# Patient Record
Sex: MALE | Race: WHITE | Employment: OTHER | URBAN - METROPOLITAN AREA
[De-identification: names, ages, dates, MRNs, and addresses within clinical notes are randomized per-mention and may not be internally consistent; named-entity substitution may affect disease eponyms.]

---

## 2017-08-21 PROBLEM — C85.80 MARGINAL ZONE LYMPHOMA (HCC): Status: ACTIVE | Noted: 2017-08-21

## 2018-05-21 ENCOUNTER — OFFICE VISIT (OUTPATIENT)
Dept: FAMILY MEDICINE CLINIC | Age: 78
End: 2018-05-21
Payer: MEDICARE

## 2018-05-21 VITALS
TEMPERATURE: 97.8 F | HEART RATE: 78 BPM | WEIGHT: 178 LBS | HEIGHT: 67 IN | RESPIRATION RATE: 14 BRPM | SYSTOLIC BLOOD PRESSURE: 138 MMHG | OXYGEN SATURATION: 97 % | BODY MASS INDEX: 27.94 KG/M2 | DIASTOLIC BLOOD PRESSURE: 87 MMHG

## 2018-05-21 DIAGNOSIS — S39.012A STRAIN OF LUMBAR REGION, INITIAL ENCOUNTER: Primary | ICD-10-CM

## 2018-05-21 PROCEDURE — 99213 OFFICE O/P EST LOW 20 MIN: CPT | Performed by: FAMILY MEDICINE

## 2018-06-10 ASSESSMENT — ENCOUNTER SYMPTOMS
DIARRHEA: 0
SHORTNESS OF BREATH: 0
BLOOD IN STOOL: 0
ABDOMINAL PAIN: 0
VOMITING: 0
CHEST TIGHTNESS: 0
CONSTIPATION: 0
COUGH: 0
BACK PAIN: 1
WHEEZING: 0

## 2018-07-31 ENCOUNTER — TELEPHONE (OUTPATIENT)
Dept: FAMILY MEDICINE CLINIC | Age: 78
End: 2018-07-31

## 2018-07-31 DIAGNOSIS — I10 ESSENTIAL HYPERTENSION: ICD-10-CM

## 2018-07-31 DIAGNOSIS — C85.90 STAGE 4 LOW GRADE LYMPHOMA (HCC): ICD-10-CM

## 2018-07-31 DIAGNOSIS — D69.6 THROMBOCYTOPENIA (HCC): ICD-10-CM

## 2018-07-31 DIAGNOSIS — C61 PROSTATE CANCER (HCC): Primary | ICD-10-CM

## 2018-08-03 ENCOUNTER — HOSPITAL ENCOUNTER (OUTPATIENT)
Age: 78
Discharge: HOME | End: 2018-08-03
Payer: MEDICARE

## 2018-08-03 DIAGNOSIS — I10 ESSENTIAL HYPERTENSION: ICD-10-CM

## 2018-08-03 DIAGNOSIS — C61 PROSTATE CANCER (HCC): ICD-10-CM

## 2018-08-03 DIAGNOSIS — D69.6 THROMBOCYTOPENIA (HCC): ICD-10-CM

## 2018-08-03 LAB
ANION GAP SERPL CALCULATED.3IONS-SCNC: 13 MMOL/L (ref 7–16)
BASOPHILS ABSOLUTE: 0.06 E9/L (ref 0–0.2)
BASOPHILS RELATIVE PERCENT: 0.8 % (ref 0–2)
BUN BLDV-MCNC: 20 MG/DL (ref 8–23)
CALCIUM SERPL-MCNC: 9.1 MG/DL (ref 8.6–10.2)
CHLORIDE BLD-SCNC: 103 MMOL/L (ref 98–107)
CO2: 22 MMOL/L (ref 22–29)
CREAT SERPL-MCNC: 1 MG/DL (ref 0.7–1.2)
EOSINOPHILS ABSOLUTE: 0.3 E9/L (ref 0.05–0.5)
EOSINOPHILS RELATIVE PERCENT: 3.9 % (ref 0–6)
GFR AFRICAN AMERICAN: >60
GFR NON-AFRICAN AMERICAN: >60 ML/MIN/1.73
GLUCOSE BLD-MCNC: 95 MG/DL (ref 74–109)
HCT VFR BLD CALC: 39.5 % (ref 37–54)
HEMOGLOBIN: 13.8 G/DL (ref 12.5–16.5)
IMMATURE GRANULOCYTES #: 0.04 E9/L
IMMATURE GRANULOCYTES %: 0.5 % (ref 0–5)
LYMPHOCYTES ABSOLUTE: 1.36 E9/L (ref 1.5–4)
LYMPHOCYTES RELATIVE PERCENT: 17.9 % (ref 20–42)
MCH RBC QN AUTO: 29.1 PG (ref 26–35)
MCHC RBC AUTO-ENTMCNC: 34.9 % (ref 32–34.5)
MCV RBC AUTO: 83.3 FL (ref 80–99.9)
MONOCYTES ABSOLUTE: 0.84 E9/L (ref 0.1–0.95)
MONOCYTES RELATIVE PERCENT: 11.1 % (ref 2–12)
NEUTROPHILS ABSOLUTE: 5 E9/L (ref 1.8–7.3)
NEUTROPHILS RELATIVE PERCENT: 65.8 % (ref 43–80)
PDW BLD-RTO: 14.4 FL (ref 11.5–15)
PLATELET # BLD: 213 E9/L (ref 130–450)
PMV BLD AUTO: 10.2 FL (ref 7–12)
POTASSIUM SERPL-SCNC: 4 MMOL/L (ref 3.5–5)
PROSTATE SPECIFIC ANTIGEN: 6.44 NG/ML (ref 0–4)
RBC # BLD: 4.74 E12/L (ref 3.8–5.8)
SODIUM BLD-SCNC: 138 MMOL/L (ref 132–146)
WBC # BLD: 7.6 E9/L (ref 4.5–11.5)

## 2018-08-03 PROCEDURE — 36415 COLL VENOUS BLD VENIPUNCTURE: CPT

## 2018-08-03 PROCEDURE — 85025 COMPLETE CBC W/AUTO DIFF WBC: CPT

## 2018-08-03 PROCEDURE — 80048 BASIC METABOLIC PNL TOTAL CA: CPT

## 2018-08-03 PROCEDURE — 84153 ASSAY OF PSA TOTAL: CPT

## 2018-08-04 ENCOUNTER — HOSPITAL ENCOUNTER (EMERGENCY)
Age: 78
Discharge: HOME | End: 2018-08-04
Attending: EMERGENCY MEDICINE
Payer: MEDICARE

## 2018-08-04 VITALS
WEIGHT: 170 LBS | OXYGEN SATURATION: 95 % | SYSTOLIC BLOOD PRESSURE: 122 MMHG | DIASTOLIC BLOOD PRESSURE: 70 MMHG | BODY MASS INDEX: 26.68 KG/M2 | HEIGHT: 67 IN | RESPIRATION RATE: 14 BRPM | HEART RATE: 72 BPM | TEMPERATURE: 98.4 F

## 2018-08-04 DIAGNOSIS — R33.9 URINARY RETENTION: Primary | ICD-10-CM

## 2018-08-04 LAB
BACTERIA: NORMAL /HPF
BILIRUBIN URINE: NEGATIVE
BLOOD, URINE: ABNORMAL
CLARITY: CLEAR
COLOR: YELLOW
GLUCOSE URINE: NEGATIVE MG/DL
KETONES, URINE: NEGATIVE MG/DL
LEUKOCYTE ESTERASE, URINE: NEGATIVE
NITRITE, URINE: NEGATIVE
PH UA: 5.5 (ref 5–9)
PROTEIN UA: NEGATIVE MG/DL
RBC UA: NORMAL /HPF (ref 0–2)
SPECIFIC GRAVITY UA: 1.02 (ref 1–1.03)
UROBILINOGEN, URINE: 0.2 E.U./DL
WBC UA: NORMAL /HPF (ref 0–5)

## 2018-08-04 PROCEDURE — 99283 EMERGENCY DEPT VISIT LOW MDM: CPT

## 2018-08-04 PROCEDURE — 81001 URINALYSIS AUTO W/SCOPE: CPT

## 2018-08-04 PROCEDURE — 51702 INSERT TEMP BLADDER CATH: CPT

## 2018-08-04 RX ORDER — TAMSULOSIN HYDROCHLORIDE 0.4 MG/1
0.4 CAPSULE ORAL DAILY
Qty: 12 CAPSULE | Refills: 0 | Status: SHIPPED | OUTPATIENT
Start: 2018-08-04 | End: 2018-12-20

## 2018-08-04 ASSESSMENT — PAIN DESCRIPTION - ORIENTATION: ORIENTATION: LOWER

## 2018-08-04 ASSESSMENT — PAIN DESCRIPTION - FREQUENCY: FREQUENCY: CONTINUOUS

## 2018-08-04 ASSESSMENT — PAIN DESCRIPTION - DESCRIPTORS: DESCRIPTORS: PRESSURE

## 2018-08-04 ASSESSMENT — PAIN SCALES - GENERAL: PAINLEVEL_OUTOF10: 5

## 2018-08-04 ASSESSMENT — PAIN DESCRIPTION - LOCATION: LOCATION: ABDOMEN

## 2018-08-04 ASSESSMENT — PAIN DESCRIPTION - PAIN TYPE: TYPE: ACUTE PAIN

## 2018-08-04 NOTE — ED NOTES
Leg bag emptied of blood tinged urine. Catheter care and leg bag/bed bag instructions given. Verbalized understanding by patient and daughter.      Valentin Homans, RN  08/04/18 1024

## 2018-08-22 ENCOUNTER — OFFICE VISIT (OUTPATIENT)
Dept: FAMILY MEDICINE CLINIC | Age: 78
End: 2018-08-22
Payer: MEDICARE

## 2018-08-22 VITALS
BODY MASS INDEX: 26.37 KG/M2 | HEART RATE: 78 BPM | DIASTOLIC BLOOD PRESSURE: 70 MMHG | SYSTOLIC BLOOD PRESSURE: 106 MMHG | TEMPERATURE: 96.7 F | WEIGHT: 168 LBS | HEIGHT: 67 IN | RESPIRATION RATE: 16 BRPM

## 2018-08-22 DIAGNOSIS — C61 PROSTATE CANCER (HCC): Primary | ICD-10-CM

## 2018-08-22 DIAGNOSIS — E78.5 HYPERLIPIDEMIA, UNSPECIFIED HYPERLIPIDEMIA TYPE: ICD-10-CM

## 2018-08-22 DIAGNOSIS — I10 ESSENTIAL HYPERTENSION: ICD-10-CM

## 2018-08-22 DIAGNOSIS — Z23 NEED FOR SHINGLES VACCINE: ICD-10-CM

## 2018-08-22 DIAGNOSIS — R73.9 HYPERGLYCEMIA: ICD-10-CM

## 2018-08-22 PROCEDURE — 90750 HZV VACC RECOMBINANT IM: CPT | Performed by: FAMILY MEDICINE

## 2018-08-22 PROCEDURE — 90471 IMMUNIZATION ADMIN: CPT | Performed by: FAMILY MEDICINE

## 2018-08-22 PROCEDURE — 99214 OFFICE O/P EST MOD 30 MIN: CPT | Performed by: FAMILY MEDICINE

## 2018-08-22 ASSESSMENT — PATIENT HEALTH QUESTIONNAIRE - PHQ9
2. FEELING DOWN, DEPRESSED OR HOPELESS: 0
SUM OF ALL RESPONSES TO PHQ QUESTIONS 1-9: 0
SUM OF ALL RESPONSES TO PHQ QUESTIONS 1-9: 0
SUM OF ALL RESPONSES TO PHQ9 QUESTIONS 1 & 2: 0
1. LITTLE INTEREST OR PLEASURE IN DOING THINGS: 0

## 2018-08-22 NOTE — PROGRESS NOTES
Kade Bartlett  : 1940    Chief Complaint:     Chief Complaint   Patient presents with    Hypertension       HPI  Will see Dr Marc Braswell today for jalloh catheter removal since  with urinary retention. PSAs have been stable. HTN controlled, taking meds as rx. Denies symptoms high bp including HA, vision changes, CP, SOB, edema, focal neuro deficits. No symptoms low bp. Has HLD. Taking statin. No s/e. Attempting to watch diet including fatty foods. No CP, dyspnea, chest tightness, exertional symptoms, neuro deficits. Lab Results   Component Value Date    CHOL 177 2018    TRIG 181 2018    HDL 36 2018    LDLCALC 105 2018    LABVLDL 36 2018         Past medical, surgical, family and social histories reviewed and updated today as appropriate    Health Maintenance Due   Topic Date Due    DTaP/Tdap/Td vaccine (1 - Tdap) 1959       Current Outpatient Prescriptions   Medication Sig Dispense Refill    amLODIPine (NORVASC) 5 MG tablet TAKE 1 TABLET DAILY 90 tablet 3    lisinopril (PRINIVIL;ZESTRIL) 20 MG tablet TAKE ONE-HALF (1/2) TABLET TWICE A DAY 90 tablet 3    metoprolol tartrate (LOPRESSOR) 25 MG tablet TAKE ONE-HALF (1/2) TABLET TWICE A DAY 90 tablet 3    vitamin E 400 UNIT capsule Take 400 Units by mouth daily.  Vitamin D (CHOLECALCIFEROL) 1000 UNITS CAPS capsule Take 1,000 Units by mouth daily.  tamsulosin (FLOMAX) 0.4 MG capsule Take 1 capsule by mouth daily for 12 days 12 capsule 0     No current facility-administered medications for this visit. No Known Allergies      REVIEW OF SYSTEMS  Review of Systems   Constitutional: Negative for chills, diaphoresis and fever. Eyes: Negative for redness and visual disturbance. Respiratory: Negative for cough, chest tightness, shortness of breath and wheezing. Cardiovascular: Negative for chest pain, palpitations and leg swelling.    Gastrointestinal: Negative for abdominal pain, blood in stool, constipation, diarrhea and vomiting. Endocrine: Negative for polydipsia and polyuria. Skin: Negative for color change, pallor and rash. Neurological: Negative for dizziness, syncope, weakness, numbness and headaches. Psychiatric/Behavioral: Negative for confusion and dysphoric mood. The patient is not nervous/anxious. All other systems reviewed and are negative. PHYSICAL EXAM  /70   Pulse 78   Temp 96.7 °F (35.9 °C)   Resp 16   Ht 5' 7\" (1.702 m)   Wt 168 lb (76.2 kg)   BMI 26.31 kg/m²   Physical Exam   Constitutional: He is oriented to person, place, and time. He appears well-developed and well-nourished. No distress. Neck: Neck supple. No JVD present. No thyromegaly present. Cardiovascular: Normal rate and regular rhythm. Exam reveals no gallop and no friction rub. No murmur heard. Pulmonary/Chest: Effort normal and breath sounds normal. No respiratory distress. He has no wheezes. He has no rales. Abdominal: Soft. Bowel sounds are normal. He exhibits no distension. There is no tenderness. Musculoskeletal: He exhibits no edema. Lymphadenopathy:     He has no cervical adenopathy. Neurological: He is alert and oriented to person, place, and time. Skin: Skin is warm and dry. No rash noted. No erythema. No pallor. Vitals reviewed. ASSESSMENT/PLAN:     Diagnosis Orders   1. Prostate cancer ([address]  PSA, Diagnostic   2. Need for shingles vaccine  Zoster recombinant [facility])   3. Essential hypertension  CBC Auto Differential    Comprehensive Metabolic Panel    Hemoglobin A1C   4. Hyperlipidemia, unspecified hyperlipidemia type  Lipid Panel    Hemoglobin A1C   5. Hyperglycemia  Hemoglobin A1C     Urology  BP controlled  Lipids controlled  Check A1c  The patient is asked to make an attempt to improve diet and exercise patterns to aid in medical management of this problem. Reviewed age and gender appropriate health screening exams and vaccinations. Advised patient regarding importance of keeping up with recommended health maintenance and to schedule as soon as possible if overdue, as this is important in assessing for undiagnosed pathology, especially cancer. Patient verbalizes understanding and agrees. Call or go to ED immediately if symptoms worsen or persist.  No Follow-up on file. Sooner if necessary. Counseled regarding above diagnosis, including possible risks and complications,  especially if left uncontrolled. Counseled regarding the possible side effects, risks, benefits and alternatives to treatment; patient and/or guardian verbalizes understanding. Advised patient to call with any new medication issues. All questions answered.     Xavier Kovacs MD  8/22/18

## 2018-08-24 ENCOUNTER — HOSPITAL ENCOUNTER (EMERGENCY)
Age: 78
Discharge: HOME | End: 2018-08-24
Attending: EMERGENCY MEDICINE
Payer: MEDICARE

## 2018-08-24 VITALS
RESPIRATION RATE: 16 BRPM | OXYGEN SATURATION: 95 % | WEIGHT: 146 LBS | SYSTOLIC BLOOD PRESSURE: 146 MMHG | BODY MASS INDEX: 22.91 KG/M2 | TEMPERATURE: 98.3 F | HEART RATE: 94 BPM | HEIGHT: 67 IN | DIASTOLIC BLOOD PRESSURE: 76 MMHG

## 2018-08-24 DIAGNOSIS — R33.9 URINARY RETENTION: Primary | ICD-10-CM

## 2018-08-24 PROCEDURE — 51702 INSERT TEMP BLADDER CATH: CPT

## 2018-08-24 PROCEDURE — 99283 EMERGENCY DEPT VISIT LOW MDM: CPT

## 2018-08-25 NOTE — ED PROVIDER NOTES
personally reviewed by me in its entirety. I confirm that the note above accurately reflects all work, treatment, procedures, and medical decision making performed by me.          Loreta Snyder DO  08/24/18 [id number]

## 2018-08-25 NOTE — ED NOTES
Discharge instructions reviewed with pt including diagnosis, follow up appointments, and S/S of when to return.   Pt verbalized understanding      Brenda Jonas RN  08/24/18 7004

## 2018-08-25 NOTE — ED NOTES
Total output of jalloh catheter about [address] [facility], [address] [place] [address]  08/24/18 2230

## 2018-08-27 ENCOUNTER — HOSPITAL ENCOUNTER (EMERGENCY)
Age: 78
Discharge: HOME | End: 2018-08-27
Attending: EMERGENCY MEDICINE
Payer: MEDICARE

## 2018-08-27 VITALS
OXYGEN SATURATION: 99 % | TEMPERATURE: 97.8 F | WEIGHT: 146 LBS | HEART RATE: 80 BPM | DIASTOLIC BLOOD PRESSURE: 70 MMHG | HEIGHT: 67 IN | RESPIRATION RATE: 14 BRPM | SYSTOLIC BLOOD PRESSURE: 116 MMHG | BODY MASS INDEX: 22.91 KG/M2

## 2018-08-27 DIAGNOSIS — R33.9 URINARY RETENTION: Primary | ICD-10-CM

## 2018-08-27 LAB
BACTERIA: ABNORMAL /HPF
BILIRUBIN URINE: NEGATIVE
BLOOD, URINE: ABNORMAL
CLARITY: CLEAR
COLOR: YELLOW
GLUCOSE URINE: NEGATIVE MG/DL
KETONES, URINE: NEGATIVE MG/DL
LEUKOCYTE ESTERASE, URINE: NEGATIVE
NITRITE, URINE: NEGATIVE
PH UA: 6 (ref 5–9)
PROTEIN UA: 100 MG/DL
RBC UA: ABNORMAL /HPF (ref 0–2)
SPECIFIC GRAVITY UA: 1.01 (ref 1–1.03)
UROBILINOGEN, URINE: 0.2 E.U./DL
WBC UA: ABNORMAL /HPF (ref 0–5)

## 2018-08-27 PROCEDURE — 87088 URINE BACTERIA CULTURE: CPT

## 2018-08-27 PROCEDURE — 81001 URINALYSIS AUTO W/SCOPE: CPT

## 2018-08-27 PROCEDURE — 99283 EMERGENCY DEPT VISIT LOW MDM: CPT

## 2018-08-27 NOTE — ED PROVIDER NOTES
ED Attending  CC: No     Department of Emergency Medicine   ED  Provider Note  Admit Date/RoomTime: 8/27/2018 11:32 AM  ED Room: 07/07  Chief Complaint   Other (catheter check)    History of Present Illness   Source of history provided by:  patient. History/Exam Limitations: none. Chuy Presley is a 69 y.o. old male who has a past medical history of:   Past Medical History:   Diagnosis Date    Hypertension     Urinary retention     presents to the emergency department by private vehicle and accompanied by family member, for desire to find out why he has needed to have a catheter intermittently for the last month. Since onset the symptoms have been intermittent and moderate in severity. Symptoms are associated with nothing pertinent. He has a history of no complicating symptoms. He states that he is told his prostate is \"fairly normal\". He states he has been taking the flomax as prescribed earlier in August. He states he was seen in the ED on 8/4 and had a catheter placed and it was removed on 8/10 at the office of Dr. Nabil Mcgovern his urologist. He states he had to return later that day for replacement of catheter due to inability to void. He states he was then seen in Dr. Jesse Esparza office on 8/24 and had the catheter removed and required the catheter to be placed again in the ED later that same night. He denies fevers, chills, flank pain, nausea, vomiting, or abdominal pain. Urine is without gross hematuria and is not cloudy in appearance. He states he attempted to call Dr. Nabil Mcgovern today but he was in the OR and was told to \"go to the emergency department\". He states that he plans on calling Dr. Nabil Mcgovern tomorrow. He states that he just wants to know what is going on. ROS    Pertinent positives and negatives are stated within HPI, all other systems reviewed and are negative.     Past Surgical History:   Procedure Laterality Date    APPENDECTOMY      COLONOSCOPY      TONSILLECTOMY     Social History: reports that he has never smoked. He has never used smokeless tobacco. He reports that he does not drink alcohol or use drugs. Family History: family history is not on file. Allergies: Patient has no known allergies. Physical Exam            ED Triage Vitals   BP Temp Temp Source Pulse Resp SpO2 Height Weight   08/27/18 1140 08/27/18 1140 08/27/18 1140 08/27/18 1140 08/27/18 1140 08/27/18 1140 08/27/18 1142 08/27/18 1142   116/70 97.8 °F (36.6 °C) Oral 80 14 99 % 5' 7\" (1.702 m) 146 lb (66.2 kg)      Oxygen Saturation Interpretation: Normal.    · Constitutional:  Alert, development consistent with age. · HEENT:  NC/NT. Airway patent. · Neck:  Normal ROM. Supple. · Respiratory:  Lungs Clear to auscultation and breath sounds equal.  · CV:  Regular rate and rhythm, normal heart sounds, without pathological murmurs, ectopy, gallops, or rubs. · GI:  General Appearance: normal.         Bowel sounds: normal bowel sounds. Distension:  None. Tenderness: No abdominal tenderness, no rebound tenderness, no guarding, abdominal rigidity is absent. Liver: non-tender. Spleen:  non-tender. Pulsatile Mass: absent. Hernia:  no inguinal or femoral hernias noted. · : (chaperone present during examination). Rectal exam not completed. Male genitalia without gross abnormalities   · Back: CVA Tenderness: No.  · Integument:  Normal turgor. Warm, dry, without visible rash, unless noted elsewhere. Lymphatics: No lymphangitis or adenopathy noted. · Neurological:  Oriented. Motor functions intact.     Lab / Imaging Results   (All laboratory and radiology results have been personally reviewed by myself)  Labs:  Results for orders placed or performed during the hospital encounter of 08/27/18   Urinalysis   Result Value Ref Range    Color, UA Yellow Straw/Yellow    Clarity, UA Clear Clear    Glucose, Ur Negative Negative mg/dL    Bilirubin Urine

## 2018-08-27 NOTE — ED NOTES
Patient presents to er with catheter in , patient is wanting to know what is going on with his jalloh cath, patient has been seen in er and dr Georgie Finney office with jalloh insertion for retention then removal , and retention returning and cath reinserted      Debbie Gaines RN  08/27/18 1520

## 2018-08-27 NOTE — ED NOTES
sitting in bed patient up to restroom,and emptied catheter in restroom      Sarita Hatch RN  08/27/18 7398

## 2018-08-29 LAB — URINE CULTURE, ROUTINE: NORMAL

## 2018-09-03 ASSESSMENT — ENCOUNTER SYMPTOMS
BLOOD IN STOOL: 0
EYE REDNESS: 0
SHORTNESS OF BREATH: 0
WHEEZING: 0
CHEST TIGHTNESS: 0
COLOR CHANGE: 0
DIARRHEA: 0
ABDOMINAL PAIN: 0
COUGH: 0
CONSTIPATION: 0
VOMITING: 0

## 2018-09-05 ENCOUNTER — TELEPHONE (OUTPATIENT)
Dept: FAMILY MEDICINE CLINIC | Age: 78
End: 2018-09-05

## 2018-09-05 NOTE — TELEPHONE ENCOUNTER
Spoke with Edinson Renner. Pt had a cath over the last 5wks that was removed this morning. They added fluid to his bladder and scoped his bladder. Daughter was concerned that he had not released the fluid by 1pm. Dr Hitesh Pop, urology office said to call them if he had not released the fluid by 2pm. She wanted to know if here was something else that could have been ordered from this office to help  diagnose the fluid retention issue. Daughter said that she will call if there are any other problems but she wanted to know if there may be something else that is being missed since he is getting so weak and still having the same issue.   #[phone]

## 2018-09-07 ENCOUNTER — TELEPHONE (OUTPATIENT)
Dept: FAMILY MEDICINE CLINIC | Age: 78
End: 2018-09-07

## 2018-09-07 ENCOUNTER — HOSPITAL ENCOUNTER (OUTPATIENT)
Age: 78
Discharge: HOME | End: 2018-09-07
Payer: MEDICARE

## 2018-09-07 LAB
ALBUMIN SERPL-MCNC: 3.1 G/DL (ref 3.5–5.2)
ALP BLD-CCNC: 66 U/L (ref 40–129)
ALT SERPL-CCNC: 18 U/L (ref 0–40)
ANION GAP SERPL CALCULATED.3IONS-SCNC: 14 MMOL/L (ref 7–16)
AST SERPL-CCNC: 13 U/L (ref 0–39)
BILIRUB SERPL-MCNC: 0.4 MG/DL (ref 0–1.2)
BUN BLDV-MCNC: 20 MG/DL (ref 8–23)
CALCIUM SERPL-MCNC: 9.3 MG/DL (ref 8.6–10.2)
CHLORIDE BLD-SCNC: 100 MMOL/L (ref 98–107)
CO2: 21 MMOL/L (ref 22–29)
CREAT SERPL-MCNC: 1 MG/DL (ref 0.7–1.2)
GFR AFRICAN AMERICAN: >60
GFR NON-AFRICAN AMERICAN: >60 ML/MIN/1.73
GLUCOSE BLD-MCNC: 94 MG/DL (ref 74–109)
HCT VFR BLD CALC: 36.4 % (ref 37–54)
HEMOGLOBIN: 12 G/DL (ref 12.5–16.5)
MCH RBC QN AUTO: 28 PG (ref 26–35)
MCHC RBC AUTO-ENTMCNC: 33 % (ref 32–34.5)
MCV RBC AUTO: 84.8 FL (ref 80–99.9)
PDW BLD-RTO: 14.3 FL (ref 11.5–15)
PLATELET # BLD: 263 E9/L (ref 130–450)
PMV BLD AUTO: 10 FL (ref 7–12)
POTASSIUM SERPL-SCNC: 4.3 MMOL/L (ref 3.5–5)
RBC # BLD: 4.29 E12/L (ref 3.8–5.8)
SODIUM BLD-SCNC: 135 MMOL/L (ref 132–146)
TOTAL PROTEIN: 5.4 G/DL (ref 6.4–8.3)
WBC # BLD: 6.4 E9/L (ref 4.5–11.5)

## 2018-09-07 PROCEDURE — 80053 COMPREHEN METABOLIC PANEL: CPT

## 2018-09-07 PROCEDURE — 36415 COLL VENOUS BLD VENIPUNCTURE: CPT

## 2018-09-07 PROCEDURE — 85027 COMPLETE CBC AUTOMATED: CPT

## 2018-09-07 NOTE — TELEPHONE ENCOUNTER
Daughter called. He was seen by urology again, they replaced the cath. He was seen by Hortencia Daly today. They think his urine difficulty may be from constipation. They are giving him some meds to try to help with his bowels and then reevaluate. His BP has been around 100-102/62-68. He is somewhat confused and they think it might be from his bp meds and the Flomax. His daughter wanted to know what you think about his BP numbers and if you wanted to see him.

## 2018-09-13 NOTE — TELEPHONE ENCOUNTER
Lactulose, citracil, mirilax , was given by GI. Patient daughter stated that mag citrate given Monday night cleared pt mon, Tuesday, but pt has not moved bowels since.   Dr Anjel Weldon was seen this am and he believes that patient may have a bowel obstruction

## 2018-09-14 ENCOUNTER — HOSPITAL ENCOUNTER (EMERGENCY)
Age: 78
Discharge: HOME | End: 2018-09-14
Attending: EMERGENCY MEDICINE
Payer: MEDICARE

## 2018-09-14 ENCOUNTER — APPOINTMENT (OUTPATIENT)
Dept: CT IMAGING | Age: 78
End: 2018-09-14
Payer: MEDICARE

## 2018-09-14 VITALS
HEART RATE: 77 BPM | DIASTOLIC BLOOD PRESSURE: 77 MMHG | WEIGHT: 164 LBS | OXYGEN SATURATION: 97 % | TEMPERATURE: 97.6 F | SYSTOLIC BLOOD PRESSURE: 138 MMHG | RESPIRATION RATE: 16 BRPM | HEIGHT: 67 IN | BODY MASS INDEX: 25.74 KG/M2

## 2018-09-14 DIAGNOSIS — K59.00 CONSTIPATION, UNSPECIFIED CONSTIPATION TYPE: ICD-10-CM

## 2018-09-14 DIAGNOSIS — C79.9 METASTATIC CANCER (HCC): Primary | ICD-10-CM

## 2018-09-14 LAB
ANION GAP SERPL CALCULATED.3IONS-SCNC: 12 MMOL/L (ref 7–16)
BASOPHILS ABSOLUTE: 0.07 E9/L (ref 0–0.2)
BASOPHILS RELATIVE PERCENT: 0.7 % (ref 0–2)
BUN BLDV-MCNC: 16 MG/DL (ref 8–23)
CALCIUM SERPL-MCNC: 8.9 MG/DL (ref 8.6–10.2)
CHLORIDE BLD-SCNC: 98 MMOL/L (ref 98–107)
CO2: 23 MMOL/L (ref 22–29)
CREAT SERPL-MCNC: 1 MG/DL (ref 0.7–1.2)
EOSINOPHILS ABSOLUTE: 0.27 E9/L (ref 0.05–0.5)
EOSINOPHILS RELATIVE PERCENT: 2.6 % (ref 0–6)
GFR AFRICAN AMERICAN: >60
GFR NON-AFRICAN AMERICAN: >60 ML/MIN/1.73
GLUCOSE BLD-MCNC: 101 MG/DL (ref 74–109)
HCT VFR BLD CALC: 39.2 % (ref 37–54)
HEMOGLOBIN: 13 G/DL (ref 12.5–16.5)
IMMATURE GRANULOCYTES #: 0.28 E9/L
IMMATURE GRANULOCYTES %: 2.7 % (ref 0–5)
LACTIC ACID: 1.9 MMOL/L (ref 0.5–2.2)
LYMPHOCYTES ABSOLUTE: 1.23 E9/L (ref 1.5–4)
LYMPHOCYTES RELATIVE PERCENT: 11.8 % (ref 20–42)
MCH RBC QN AUTO: 28.1 PG (ref 26–35)
MCHC RBC AUTO-ENTMCNC: 33.2 % (ref 32–34.5)
MCV RBC AUTO: 84.7 FL (ref 80–99.9)
MONOCYTES ABSOLUTE: 1 E9/L (ref 0.1–0.95)
MONOCYTES RELATIVE PERCENT: 9.6 % (ref 2–12)
NEUTROPHILS ABSOLUTE: 7.59 E9/L (ref 1.8–7.3)
NEUTROPHILS RELATIVE PERCENT: 72.6 % (ref 43–80)
PDW BLD-RTO: 14.3 FL (ref 11.5–15)
PLATELET # BLD: 289 E9/L (ref 130–450)
PMV BLD AUTO: 9.8 FL (ref 7–12)
POTASSIUM SERPL-SCNC: 4.4 MMOL/L (ref 3.5–5)
RBC # BLD: 4.63 E12/L (ref 3.8–5.8)
SODIUM BLD-SCNC: 133 MMOL/L (ref 132–146)
WBC # BLD: 10.4 E9/L (ref 4.5–11.5)

## 2018-09-14 PROCEDURE — 99284 EMERGENCY DEPT VISIT MOD MDM: CPT

## 2018-09-14 PROCEDURE — 80048 BASIC METABOLIC PNL TOTAL CA: CPT

## 2018-09-14 PROCEDURE — 6360000004 HC RX CONTRAST MEDICATION: Performed by: RADIOLOGY

## 2018-09-14 PROCEDURE — 83605 ASSAY OF LACTIC ACID: CPT

## 2018-09-14 PROCEDURE — 74177 CT ABD & PELVIS W/CONTRAST: CPT

## 2018-09-14 PROCEDURE — 85025 COMPLETE CBC W/AUTO DIFF WBC: CPT

## 2018-09-14 RX ORDER — BISACODYL 10 MG
10 SUPPOSITORY, RECTAL RECTAL DAILY PRN
Qty: 30 SUPPOSITORY | Refills: 0 | Status: SHIPPED | OUTPATIENT
Start: 2018-09-14 | End: 2018-10-14

## 2018-09-14 RX ORDER — DUTASTERIDE 0.5 MG/1
0.5 CAPSULE, LIQUID FILLED ORAL EVERY EVENING
COMMUNITY
End: 2018-12-20

## 2018-09-14 RX ORDER — POLYETHYLENE GLYCOL 3350 17 G/17G
17 POWDER, FOR SOLUTION ORAL DAILY
COMMUNITY
End: 2018-09-24 | Stop reason: ALTCHOICE

## 2018-09-14 RX ADMIN — IOPAMIDOL 110 ML: 755 INJECTION, SOLUTION INTRAVENOUS at 11:56

## 2018-09-14 ASSESSMENT — ENCOUNTER SYMPTOMS
ABDOMINAL PAIN: 0
WHEEZING: 0
COLOR CHANGE: 0
CONSTIPATION: 1
DIARRHEA: 0
VOMITING: 0
SHORTNESS OF BREATH: 0

## 2018-09-14 NOTE — CONSULTS
He has not felt a lump at any  place. PHYSICAL EXAMINATION:  GENERAL:  Reveals an elderly gentleman. VITAL SIGNS:  Stable vital signs. HEENT:  No clinical pallor or jaundice. NECK:  No lymph glands are felt on either side of the neck, axillae, or  groins. BACK:  Spleen is slightly enlarged on the CAT scan, but no felt on clinical  examination. ABDOMEN:  Liver is not enlarged. No ascites. HEART:  Cardiac rhythm is normal and sinus with a short murmur. LUNGS:  Clear to auscultation and percussion. :  Indwelling catheter in place. EXTREMITIES:  No edema of the legs. PLAN AND ASSESSMENT:  Spoke to Dr. Mirlande Stoner. The patient has been given  various means to take care of the constipation. His daughter was in the  room and I also spoke to her. He has an appointment to see Dr. Rosette Beatty on  09/15, and told to keep his appointment on time. He will evaluate his  scans and decide what options to follow. Thank you for asking me see this patient in consultation.         Samara Bauer MD    D: 09/14/2018 14:12:23       T: 09/14/2018 19:07:56     [id number]  Job#: [id number]     Doc#: [id number]    CC:  Diana Dooley MD

## 2018-09-14 NOTE — ED NOTES
D/C instructions reviewed with Pt/family; Pt/family verbalized understanding and had no further questions or concerns. Pt stable and ambulatory upon leaving ER.      Melinda Valdez RN  09/14/18 0344

## 2018-09-17 ENCOUNTER — TELEPHONE (OUTPATIENT)
Dept: FAMILY MEDICINE CLINIC | Age: 78
End: 2018-09-17

## 2018-09-17 NOTE — TELEPHONE ENCOUNTER
Spoke with the [place], pts daughter. They found out the pt has cancer from a CT that was done at [facility]. He was seen with oncology in the ED and he will see them again on wed in office. This was all done on Friday. They also said that he is severely dehydrated so they are pushing his fluid intake. She will call after the appt on wed to give an update.

## 2018-09-19 ENCOUNTER — HOSPITAL ENCOUNTER (OUTPATIENT)
Age: 78
Discharge: HOME | End: 2018-09-21
Payer: MEDICARE

## 2018-09-19 LAB
APTT: 29 SEC (ref 24.5–35.1)
INR BLD: 1
PROTHROMBIN TIME: 11.9 SEC (ref 9.3–12.4)

## 2018-09-19 PROCEDURE — 85610 PROTHROMBIN TIME: CPT

## 2018-09-19 PROCEDURE — 85730 THROMBOPLASTIN TIME PARTIAL: CPT

## 2018-09-20 ENCOUNTER — TELEPHONE (OUTPATIENT)
Dept: FAMILY MEDICINE CLINIC | Age: 78
End: 2018-09-20

## 2018-09-24 ENCOUNTER — HOSPITAL ENCOUNTER (OUTPATIENT)
Dept: GENERAL RADIOLOGY | Age: 78
Discharge: HOME | End: 2018-09-26
Payer: MEDICARE

## 2018-09-24 ENCOUNTER — HOSPITAL ENCOUNTER (OUTPATIENT)
Dept: CT IMAGING | Age: 78
Discharge: HOME | End: 2018-09-26
Payer: MEDICARE

## 2018-09-24 VITALS
HEART RATE: 84 BPM | TEMPERATURE: 98 F | RESPIRATION RATE: 16 BRPM | OXYGEN SATURATION: 95 % | SYSTOLIC BLOOD PRESSURE: 133 MMHG | DIASTOLIC BLOOD PRESSURE: 76 MMHG

## 2018-09-24 DIAGNOSIS — Z98.890 STATUS POST BIOPSY: ICD-10-CM

## 2018-09-24 DIAGNOSIS — R91.8 LUNG MASS: ICD-10-CM

## 2018-09-24 PROCEDURE — 88305 TISSUE EXAM BY PATHOLOGIST: CPT

## 2018-09-24 PROCEDURE — 2720000010 CT GUIDED NEEDLE PLACEMENT

## 2018-09-24 PROCEDURE — 32405 CT NEEDLE BIOPSY LUNG PERCUTANEOUS: CPT

## 2018-09-24 PROCEDURE — 88342 IMHCHEM/IMCYTCHM 1ST ANTB: CPT

## 2018-09-24 PROCEDURE — 88341 IMHCHEM/IMCYTCHM EA ADD ANTB: CPT

## 2018-09-24 PROCEDURE — 7100000010 HC PHASE II RECOVERY - FIRST 15 MIN

## 2018-09-24 PROCEDURE — 71045 X-RAY EXAM CHEST 1 VIEW: CPT

## 2018-09-24 PROCEDURE — 7100000011 HC PHASE II RECOVERY - ADDTL 15 MIN

## 2018-09-24 RX ORDER — LIDOCAINE HYDROCHLORIDE 20 MG/ML
20 INJECTION, SOLUTION INFILTRATION; PERINEURAL ONCE
Status: DISCONTINUED | OUTPATIENT
Start: 2018-09-24 | End: 2018-09-27 | Stop reason: HOSPADM

## 2018-09-24 ASSESSMENT — PAIN SCALES - GENERAL
PAINLEVEL_OUTOF10: 0

## 2018-09-24 NOTE — H&P
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 , Disp: , Rfl:     dutasteride (AVODART) 0.5 MG capsule, Take 0.5 mg by mouth daily, Disp: , Rfl:     bisacodyl (DULCOLAX) 10 MG suppository, Place 1 suppository rectally daily as needed for Constipation, Disp: 30 suppository, Rfl: 0    amLODIPine (NORVASC) 5 MG tablet, TAKE 1 TABLET DAILY, Disp: 90 tablet, Rfl: 3    lisinopril (PRINIVIL;ZESTRIL) 20 MG tablet, TAKE ONE-HALF (1/2) TABLET TWICE A DAY, Disp: 90 tablet, Rfl: 3    metoprolol tartrate (LOPRESSOR) 25 MG tablet, TAKE ONE-HALF (1/2) TABLET TWICE A DAY, Disp: 90 tablet, Rfl: 3    vitamin E 400 UNIT capsule, Take 400 Units by mouth daily. , Disp: , Rfl:     Vitamin D (CHOLECALCIFEROL) 1000 UNITS CAPS capsule, Take 1,000 Units by mouth daily. , Disp: , Rfl:     tamsulosin (FLOMAX) 0.4 MG capsule, Take 1 capsule by mouth daily for 12 days (Patient taking differently: Take 0.4 mg by mouth 2 times daily ), Disp: 12 capsule, Rfl: 0    No Known Allergies    Past Medical History:   Diagnosis Date    Hypertension     Urinary retention        Past Surgical History:   Procedure Laterality Date    APPENDECTOMY      COLONOSCOPY      TONSILLECTOMY         No family history on file. Social History     Social History    Marital status:      Spouse name: N/A    Number of children: N/A    Years of education: N/A     Occupational History    Not on file.      Social History Main Topics    Smoking status: Never Smoker    Smokeless tobacco: Never Used    Alcohol use No    Drug use: No    Sexual activity: Not on file     Other Topics Concern    Not on file     Social History Narrative    No narrative on file       ROS: Non-contributory other than as noted above    PHYSICAL EXAM:      Heent: Alert and orientated. Heart:  Rapid regular rhythm    Lungs:  demonstrate no contraindications to proceed      Abdomen:  normal      DATA:  CBC:   Lab Results   Component Value Date    WBC 10.4 09/14/2018    RBC 4.63 09/14/2018    HGB 13.0 09/14/2018    HCT 39.2 09/14/2018    MCV 84.7 09/14/2018    MCH 28.1 09/14/2018    MCHC 33.2 09/14/2018    RDW 14.3 09/14/2018     09/14/2018    MPV 9.8 09/14/2018     CBC with Differential:    Lab Results   Component Value Date    WBC 10.4 09/14/2018    RBC 4.63 09/14/2018    HGB 13.0 09/14/2018    HCT 39.2 09/14/2018     09/14/2018    MCV 84.7 09/14/2018    MCH 28.1 09/14/2018    MCHC 33.2 09/14/2018    RDW 14.3 09/14/2018    LYMPHOPCT 11.8 09/14/2018    MONOPCT 9.6 09/14/2018    BASOPCT 0.7 09/14/2018    MONOSABS 1.00 09/14/2018    LYMPHSABS 1.23 09/14/2018    EOSABS 0.27 09/14/2018    BASOSABS 0.07 09/14/2018     Platelets:    Lab Results   Component Value Date     09/14/2018     BUN/Creatinine:    Lab Results   Component Value Date    BUN 16 09/14/2018    CREATININE 1.0 09/14/2018       ASSESSMENT AND PLAN:  1. Right lung mass bx  2. Procedure options, risks and benefits reviewed with patient. Patient expresses understanding.     Electronically signed by Lauren Barrios MD on 9/24/2018 at 10:08 AM

## 2018-09-24 NOTE — FLOWSHEET NOTE
IRFLOWSHEET    Date: 9/24/2018    Time: 8:49 AM     Exam: Image guided right lung biopsy    Radiologist Performing Procedure: Dr. Yamileth Casey II    Nurse Reporting/Phone: [phone]     Nurse Receiving: Ellen Marcial    Permit signed:      Yes    ID Band Checklist: Yes    Allergies: Patient has no known allergies. TIME OUT: 1010    PROCEDURE START TIME: 1010    Puncture Site: right mid/lower back     Puncture Time: 1011    Catheters: 18 g x 10 cm  Jasmin Rosalind    LABS:   Lab Results   Component Value Date    INR 1.0 09/19/2018    PROTIME 11.9 09/19/2018           Lab Results   Component Value Date    CREATININE 1.0 09/14/2018    BUN 16 09/14/2018          Lab Results   Component Value Date    HGB 13.0 09/14/2018    HCT 39.2 09/14/2018     09/14/2018         PROCEDURE END TIME: 1015    Total Contrast: 4 ml isovue 300    Fluoroscopy Time: 0.1 minutes    Complications:  None    Comments: Patient arrival from home to cat scan for right lung biopsy biopsy. Consent signed, vitals taken, and IV started. Dr. Yamileth Casey II in to speak with the patient about the procedure, all questions answered. Patient placed prone on cat scan table with monitoring devices attached. Patient prepped and draped, needle inserted to right posterior lung and biopsy taken x 2. Images taken and reviewed by Dr. Roger Huerta. Needle removed, site cleansed, and dry dressing applied. Procedure completed @ 6616. Patient transported to stage II recovery, nurse handoff report called. Biopsy specimen taken to lab, post procedures orders placed.

## 2018-10-17 DIAGNOSIS — R41.0 CONFUSION: Primary | ICD-10-CM

## 2018-10-18 ENCOUNTER — HOSPITAL ENCOUNTER (INPATIENT)
Age: 78
LOS: 6 days | Discharge: HOME HEALTH | DRG: 040 | End: 2018-10-24
Attending: EMERGENCY MEDICINE | Admitting: FAMILY MEDICINE
Payer: MEDICARE

## 2018-10-18 ENCOUNTER — APPOINTMENT (OUTPATIENT)
Dept: CT IMAGING | Age: 78
DRG: 040 | End: 2018-10-18
Payer: MEDICARE

## 2018-10-18 ENCOUNTER — APPOINTMENT (OUTPATIENT)
Dept: ULTRASOUND IMAGING | Age: 78
DRG: 040 | End: 2018-10-18
Payer: MEDICARE

## 2018-10-18 ENCOUNTER — TELEPHONE (OUTPATIENT)
Dept: FAMILY MEDICINE CLINIC | Age: 78
End: 2018-10-18

## 2018-10-18 ENCOUNTER — APPOINTMENT (OUTPATIENT)
Dept: GENERAL RADIOLOGY | Age: 78
DRG: 040 | End: 2018-10-18
Payer: MEDICARE

## 2018-10-18 DIAGNOSIS — I82.401 ACUTE DEEP VEIN THROMBOSIS (DVT) OF RIGHT LOWER EXTREMITY, UNSPECIFIED VEIN (HCC): Primary | ICD-10-CM

## 2018-10-18 PROBLEM — A41.9 SEPSIS DUE TO URINARY TRACT INFECTION (HCC): Status: ACTIVE | Noted: 2018-10-18

## 2018-10-18 PROBLEM — N39.0 SEPSIS DUE TO URINARY TRACT INFECTION (HCC): Status: ACTIVE | Noted: 2018-10-18

## 2018-10-18 LAB
ALBUMIN SERPL-MCNC: 3.4 G/DL (ref 3.5–5.2)
ALP BLD-CCNC: 104 U/L (ref 40–129)
ALT SERPL-CCNC: 18 U/L (ref 0–40)
AMORPHOUS: ABNORMAL
ANION GAP SERPL CALCULATED.3IONS-SCNC: 11 MMOL/L (ref 7–16)
AST SERPL-CCNC: 32 U/L (ref 0–39)
BACTERIA: ABNORMAL /HPF
BASOPHILS ABSOLUTE: 0.07 E9/L (ref 0–0.2)
BASOPHILS RELATIVE PERCENT: 0.7 % (ref 0–2)
BILIRUB SERPL-MCNC: 0.5 MG/DL (ref 0–1.2)
BILIRUBIN URINE: ABNORMAL
BLOOD, URINE: ABNORMAL
BUN BLDV-MCNC: 20 MG/DL (ref 8–23)
CALCIUM SERPL-MCNC: 8 MG/DL (ref 8.6–10.2)
CHLORIDE BLD-SCNC: 98 MMOL/L (ref 98–107)
CLARITY: ABNORMAL
CO2: 21 MMOL/L (ref 22–29)
COLOR: ABNORMAL
CREAT SERPL-MCNC: 0.8 MG/DL (ref 0.7–1.2)
EOSINOPHILS ABSOLUTE: 0.07 E9/L (ref 0.05–0.5)
EOSINOPHILS RELATIVE PERCENT: 0.7 % (ref 0–6)
EPITHELIAL CELLS, UA: ABNORMAL /HPF
GFR AFRICAN AMERICAN: >60
GFR NON-AFRICAN AMERICAN: >60 ML/MIN/1.73
GLUCOSE BLD-MCNC: 94 MG/DL (ref 74–109)
GLUCOSE URINE: NEGATIVE MG/DL
HCT VFR BLD CALC: 35.5 % (ref 37–54)
HEMOGLOBIN: 11.9 G/DL (ref 12.5–16.5)
IMMATURE GRANULOCYTES #: 0.12 E9/L
IMMATURE GRANULOCYTES %: 1.1 % (ref 0–5)
INR BLD: 1.1
KETONES, URINE: 15 MG/DL
LACTIC ACID, SEPSIS: 1.4 MMOL/L (ref 0.5–1.9)
LEUKOCYTE ESTERASE, URINE: ABNORMAL
LIPASE: 85 U/L (ref 13–60)
LYMPHOCYTES ABSOLUTE: 0.99 E9/L (ref 1.5–4)
LYMPHOCYTES RELATIVE PERCENT: 9.2 % (ref 20–42)
MCH RBC QN AUTO: 27.5 PG (ref 26–35)
MCHC RBC AUTO-ENTMCNC: 33.5 % (ref 32–34.5)
MCV RBC AUTO: 82 FL (ref 80–99.9)
MONOCYTES ABSOLUTE: 1.19 E9/L (ref 0.1–0.95)
MONOCYTES RELATIVE PERCENT: 11.1 % (ref 2–12)
NEUTROPHILS ABSOLUTE: 8.28 E9/L (ref 1.8–7.3)
NEUTROPHILS RELATIVE PERCENT: 77.2 % (ref 43–80)
NITRITE, URINE: POSITIVE
PDW BLD-RTO: 14.6 FL (ref 11.5–15)
PH UA: 6.5 (ref 5–9)
PLATELET # BLD: 254 E9/L (ref 130–450)
PMV BLD AUTO: 9.8 FL (ref 7–12)
POTASSIUM REFLEX MAGNESIUM: 5.3 MMOL/L (ref 3.5–5)
PROTEIN UA: 30 MG/DL
PROTHROMBIN TIME: 13.2 SEC (ref 9.3–12.4)
RBC # BLD: 4.33 E12/L (ref 3.8–5.8)
RBC UA: ABNORMAL /HPF (ref 0–2)
SODIUM BLD-SCNC: 130 MMOL/L (ref 132–146)
SPECIFIC GRAVITY UA: 1.02 (ref 1–1.03)
TOTAL PROTEIN: 5.8 G/DL (ref 6.4–8.3)
UROBILINOGEN, URINE: 0.2 E.U./DL
WBC # BLD: 10.7 E9/L (ref 4.5–11.5)
WBC UA: >20 /HPF (ref 0–5)

## 2018-10-18 PROCEDURE — 87040 BLOOD CULTURE FOR BACTERIA: CPT

## 2018-10-18 PROCEDURE — 70450 CT HEAD/BRAIN W/O DYE: CPT

## 2018-10-18 PROCEDURE — 2060000000 HC ICU INTERMEDIATE R&B

## 2018-10-18 PROCEDURE — 87186 SC STD MICRODIL/AGAR DIL: CPT

## 2018-10-18 PROCEDURE — 81001 URINALYSIS AUTO W/SCOPE: CPT

## 2018-10-18 PROCEDURE — 83690 ASSAY OF LIPASE: CPT

## 2018-10-18 PROCEDURE — 6370000000 HC RX 637 (ALT 250 FOR IP): Performed by: EMERGENCY MEDICINE

## 2018-10-18 PROCEDURE — 99285 EMERGENCY DEPT VISIT HI MDM: CPT

## 2018-10-18 PROCEDURE — 71045 X-RAY EXAM CHEST 1 VIEW: CPT

## 2018-10-18 PROCEDURE — 6370000000 HC RX 637 (ALT 250 FOR IP): Performed by: FAMILY MEDICINE

## 2018-10-18 PROCEDURE — 6360000002 HC RX W HCPCS: Performed by: EMERGENCY MEDICINE

## 2018-10-18 PROCEDURE — 74176 CT ABD & PELVIS W/O CONTRAST: CPT

## 2018-10-18 PROCEDURE — 2580000003 HC RX 258: Performed by: EMERGENCY MEDICINE

## 2018-10-18 PROCEDURE — 85610 PROTHROMBIN TIME: CPT

## 2018-10-18 PROCEDURE — 80053 COMPREHEN METABOLIC PANEL: CPT

## 2018-10-18 PROCEDURE — 2580000003 HC RX 258: Performed by: FAMILY MEDICINE

## 2018-10-18 PROCEDURE — 87077 CULTURE AEROBIC IDENTIFY: CPT

## 2018-10-18 PROCEDURE — 83605 ASSAY OF LACTIC ACID: CPT

## 2018-10-18 PROCEDURE — 85025 COMPLETE CBC W/AUTO DIFF WBC: CPT

## 2018-10-18 PROCEDURE — 87088 URINE BACTERIA CULTURE: CPT

## 2018-10-18 PROCEDURE — 93971 EXTREMITY STUDY: CPT

## 2018-10-18 PROCEDURE — 6360000002 HC RX W HCPCS

## 2018-10-18 RX ORDER — FINASTERIDE 5 MG/1
5 TABLET, FILM COATED ORAL EVERY EVENING
Status: DISCONTINUED | OUTPATIENT
Start: 2018-10-18 | End: 2018-10-24 | Stop reason: HOSPADM

## 2018-10-18 RX ORDER — DIPHENHYDRAMINE HYDROCHLORIDE 50 MG/ML
INJECTION INTRAMUSCULAR; INTRAVENOUS
Status: COMPLETED
Start: 2018-10-18 | End: 2018-10-18

## 2018-10-18 RX ORDER — LISINOPRIL 10 MG/1
10 TABLET ORAL 2 TIMES DAILY
Status: DISCONTINUED | OUTPATIENT
Start: 2018-10-18 | End: 2018-10-24 | Stop reason: HOSPADM

## 2018-10-18 RX ORDER — SODIUM CHLORIDE 9 MG/ML
INJECTION, SOLUTION INTRAVENOUS CONTINUOUS
Status: DISCONTINUED | OUTPATIENT
Start: 2018-10-18 | End: 2018-10-24 | Stop reason: HOSPADM

## 2018-10-18 RX ORDER — SODIUM CHLORIDE 0.9 % (FLUSH) 0.9 %
10 SYRINGE (ML) INJECTION EVERY 12 HOURS SCHEDULED
Status: DISCONTINUED | OUTPATIENT
Start: 2018-10-18 | End: 2018-10-24 | Stop reason: HOSPADM

## 2018-10-18 RX ORDER — SODIUM CHLORIDE 0.9 % (FLUSH) 0.9 %
10 SYRINGE (ML) INJECTION PRN
Status: DISCONTINUED | OUTPATIENT
Start: 2018-10-18 | End: 2018-10-24 | Stop reason: HOSPADM

## 2018-10-18 RX ORDER — AMLODIPINE BESYLATE 5 MG/1
5 TABLET ORAL DAILY
Status: DISCONTINUED | OUTPATIENT
Start: 2018-10-18 | End: 2018-10-24 | Stop reason: HOSPADM

## 2018-10-18 RX ORDER — ONDANSETRON 2 MG/ML
4 INJECTION INTRAMUSCULAR; INTRAVENOUS EVERY 6 HOURS PRN
Status: DISCONTINUED | OUTPATIENT
Start: 2018-10-18 | End: 2018-10-24 | Stop reason: HOSPADM

## 2018-10-18 RX ORDER — CALCIUM CITRATE/VITAMIN D3 315MG-6.25
1 TABLET ORAL 2 TIMES DAILY
Refills: 3 | COMMUNITY
Start: 2018-10-03

## 2018-10-18 RX ORDER — TAMSULOSIN HYDROCHLORIDE 0.4 MG/1
0.4 CAPSULE ORAL DAILY
Status: DISCONTINUED | OUTPATIENT
Start: 2018-10-18 | End: 2018-10-24 | Stop reason: HOSPADM

## 2018-10-18 RX ADMIN — Medication 10 ML: at 20:50

## 2018-10-18 RX ADMIN — LISINOPRIL 10 MG: 10 TABLET ORAL at 20:45

## 2018-10-18 RX ADMIN — FINASTERIDE 5 MG: 5 TABLET, FILM COATED ORAL at 20:45

## 2018-10-18 RX ADMIN — CEFTRIAXONE SODIUM 2 G: 2 INJECTION, POWDER, FOR SOLUTION INTRAMUSCULAR; INTRAVENOUS at 17:35

## 2018-10-18 RX ADMIN — AMLODIPINE BESYLATE 5 MG: 5 TABLET ORAL at 19:00

## 2018-10-18 RX ADMIN — TAMSULOSIN HYDROCHLORIDE 0.4 MG: 0.4 CAPSULE ORAL at 19:01

## 2018-10-18 RX ADMIN — APIXABAN 10 MG: 5 TABLET, FILM COATED ORAL at 20:44

## 2018-10-18 RX ADMIN — METOPROLOL TARTRATE 12.5 MG: 25 TABLET ORAL at 20:45

## 2018-10-18 RX ADMIN — DIPHENHYDRAMINE HYDROCHLORIDE 25 MG: 50 INJECTION, SOLUTION INTRAMUSCULAR; INTRAVENOUS at 17:50

## 2018-10-18 RX ADMIN — SODIUM CHLORIDE: 9 INJECTION, SOLUTION INTRAVENOUS at 19:01

## 2018-10-18 ASSESSMENT — PAIN SCALES - GENERAL: PAINLEVEL_OUTOF10: 0

## 2018-10-18 NOTE — TELEPHONE ENCOUNTER
PT daughter called about his medicine. She wanted someone to call her back about one of them.  [phone]

## 2018-10-19 ENCOUNTER — APPOINTMENT (OUTPATIENT)
Dept: MRI IMAGING | Age: 78
DRG: 040 | End: 2018-10-19
Payer: MEDICARE

## 2018-10-19 PROBLEM — E44.0 MODERATE PROTEIN-CALORIE MALNUTRITION (HCC): Chronic | Status: ACTIVE | Noted: 2018-10-19

## 2018-10-19 PROCEDURE — 6370000000 HC RX 637 (ALT 250 FOR IP): Performed by: SPECIALIST

## 2018-10-19 PROCEDURE — 6360000004 HC RX CONTRAST MEDICATION: Performed by: RADIOLOGY

## 2018-10-19 PROCEDURE — 97166 OT EVAL MOD COMPLEX 45 MIN: CPT

## 2018-10-19 PROCEDURE — 70553 MRI BRAIN STEM W/O & W/DYE: CPT

## 2018-10-19 PROCEDURE — G8987 SELF CARE CURRENT STATUS: HCPCS

## 2018-10-19 PROCEDURE — 6370000000 HC RX 637 (ALT 250 FOR IP): Performed by: EMERGENCY MEDICINE

## 2018-10-19 PROCEDURE — A9577 INJ MULTIHANCE: HCPCS | Performed by: RADIOLOGY

## 2018-10-19 PROCEDURE — 2060000000 HC ICU INTERMEDIATE R&B

## 2018-10-19 PROCEDURE — 97161 PT EVAL LOW COMPLEX 20 MIN: CPT

## 2018-10-19 PROCEDURE — 6370000000 HC RX 637 (ALT 250 FOR IP): Performed by: FAMILY MEDICINE

## 2018-10-19 PROCEDURE — 97530 THERAPEUTIC ACTIVITIES: CPT

## 2018-10-19 PROCEDURE — G8988 SELF CARE GOAL STATUS: HCPCS

## 2018-10-19 PROCEDURE — 2580000003 HC RX 258: Performed by: FAMILY MEDICINE

## 2018-10-19 RX ORDER — CEFDINIR 300 MG/1
300 CAPSULE ORAL EVERY 12 HOURS SCHEDULED
Status: DISCONTINUED | OUTPATIENT
Start: 2018-10-19 | End: 2018-10-21

## 2018-10-19 RX ADMIN — CEFDINIR 300 MG: 300 CAPSULE ORAL at 20:47

## 2018-10-19 RX ADMIN — GADOBENATE DIMEGLUMINE 14 ML: 529 INJECTION, SOLUTION INTRAVENOUS at 16:40

## 2018-10-19 RX ADMIN — FINASTERIDE 5 MG: 5 TABLET, FILM COATED ORAL at 18:21

## 2018-10-19 RX ADMIN — LISINOPRIL 10 MG: 10 TABLET ORAL at 08:53

## 2018-10-19 RX ADMIN — METOPROLOL TARTRATE 12.5 MG: 25 TABLET ORAL at 08:53

## 2018-10-19 RX ADMIN — SODIUM CHLORIDE: 9 INJECTION, SOLUTION INTRAVENOUS at 22:55

## 2018-10-19 RX ADMIN — APIXABAN 10 MG: 5 TABLET, FILM COATED ORAL at 08:53

## 2018-10-19 RX ADMIN — METOPROLOL TARTRATE 12.5 MG: 25 TABLET ORAL at 20:47

## 2018-10-19 RX ADMIN — TAMSULOSIN HYDROCHLORIDE 0.4 MG: 0.4 CAPSULE ORAL at 08:53

## 2018-10-19 RX ADMIN — SODIUM CHLORIDE: 9 INJECTION, SOLUTION INTRAVENOUS at 08:55

## 2018-10-19 RX ADMIN — AMLODIPINE BESYLATE 5 MG: 5 TABLET ORAL at 08:53

## 2018-10-19 RX ADMIN — APIXABAN 10 MG: 5 TABLET, FILM COATED ORAL at 20:47

## 2018-10-19 RX ADMIN — LISINOPRIL 10 MG: 10 TABLET ORAL at 20:47

## 2018-10-19 ASSESSMENT — PAIN SCALES - GENERAL
PAINLEVEL_OUTOF10: 0

## 2018-10-19 NOTE — PROGRESS NOTES
Nutrition Assessment    Type and Reason for Visit: Initial, Positive Nutrition Screen (wt loss, poor zackary)    Nutrition Recommendations: Continue Diet. Start Magic Cup BID. Malnutrition Assessment:  · Malnutrition Status: Meets the criteria for moderate malnutrition  · Context: Chronic illness  · Findings of the 6 clinical characteristics of malnutrition (Minimum of 2 out of 6 clinical characteristics is required to make the diagnosis of moderate or severe Protein Calorie Malnutrition based on AND/ASPEN Guidelines):  1. Energy Intake-Less than or equal to 75%, greater than or equal to 1 month    2. Weight Loss-10% loss or greater, in 6 months  3. Fat Loss-Mild subcutaneous fat loss, Orbital, Triceps  4. Muscle Loss-Mild muscle mass loss, Temples (temporalis muscle), Clavicles (pectoralis and deltoids)  5. Fluid Accumulation-No significant fluid accumulation    6.  Strength-Not measured    Nutrition Diagnosis:   · Problem: Moderate malnutrition, in context of chronic illness  · Etiology: related to Catabolic illness, Insufficient energy/nutrient consumption     Signs and symptoms:  as evidenced by Intake 50-75%, Diet history of poor intake, Weight loss, Weight loss greater than or equal to 10% in 6 months (mild muscle and mild fat loss)    Nutrition Assessment:  · Subjective Assessment: Pt w/ dementia, +AMS; dtr at bedside. Dtr states ~15-20# wt loss x ~5 months d/t decreased zackary/intake and having cancer. Dtr also states pt won't drink Ensure but will do good with Magic Cup. · Nutrition-Focused Physical Findings:  AMSx3, U/L dentures, abd WDL, +BS, +2 RLE edema, I/O's WNL  · Wound Type: None  · Current Nutrition Therapies:  · Oral Diet Orders: General   · Oral Diet intake: 51-75%, Select  · Oral Nutrition Supplement (ONS) Orders: None  · Anthropometric Measures:  · Ht: 5' 7\" (170.2 cm)   · Current Body Wt: 159 lb (72.1 kg) (bed 10/19)  · Admission Body Wt: 159 lb (72.1 kg) (bed 10/18)  · Usual Body

## 2018-10-19 NOTE — PROGRESS NOTES
Physical Therapy    Facility/Department: [facility] MED SURG  Initial Assessment    NAME: Stewart Gallego  : 1940  MRN: [id number]    Date of Service: 10/19/2018    Patient Diagnosis(es): The encounter diagnosis was Acute deep vein thrombosis (DVT) of right lower extremity, unspecified vein ([address] has a past medical history of Hypertension and Urinary retention. has a past surgical history that includes Appendectomy; Tonsillectomy; and Colonoscopy. Evaluating Therapist: Meg PT      Room #:604  DIAGNOSIS: Sepsis due to UTI  PRECAUTIONS: falls, alarm    Social:  Pt lives with daughters (daughters work during the day)  in a 2 floor plan. Prior to admission independent without device. Initial Evaluation  Date: 10/19 Treatment      Short Term/ Long Term   Goals   Was pt agreeable to Eval/treatment? yes     Does pt have pain? No c/o pain     Bed Mobility  Rolling: NT  Supine to sit: max assist of 2  Sit to supine: NT  Scooting: max assist of 2  Min assist   Transfers Sit to stand: mod assist of 2  Stand to sit: mod assist of 2  Stand pivot: mod assist of 2  Min assist   Ambulation    8 feet with no device with mod assist of 2 (hand held assist)  50 feet with AAD min assist   Stair Negotiation  Ascended and descended  NT      AM-PAC Raw score               10/24       Pt is alert, confused. Difficulty following directions  LE ROM: WFl  LE strength:Grossly 3/5  Balance: poor  Sensation: NT  Endurance: fair  Chair alarm: yes     ASSESSMENT  Pt displays functional ability as noted in the objective portion of this evaluation. Comments/Treatment:  Pt with posterior lean seated and standing. Difficulty following directions. Patient education  Pt educated on transfer techinque    Patient response to education:   Pt verbalized understanding Pt demonstrated skill Pt requires further education in this area   no With assist yes     Rehab potential is fair for reaching above PT goals.     Pts/ family goals   1. Pt unable to state    Patient and or family understand(s) diagnosis, prognosis, and plan of care. PLAN  PT care will be provided in accordance with the objectives noted above. Whenever appropriate, clear delegation orders will be provided for nursing staff. Exercises and functional mobility practice will be used as well as appropriate assistive devices or modalities to obtain goals. Patient and family education will also be administered as needed. Frequency of treatments will be 2-5x/week x 5-7 days.     Meg MORRIS  [phone]

## 2018-10-19 NOTE — PROGRESS NOTES
Occupational Therapy  OCCUPATIONAL THERAPY INITIAL EVALUATION      Date:10/19/2018  Patient Name: Katheryn Ann  MRN: [id number]  : 1940  Room: [address] [place], [place] [address]     Evaluating OT: Lindsay Cordova OTR/L    AM-PAC Daily Activity Scale Raw Score:   G-code: CL    Recommended Adaptive Equipment: TBA     Diagnosis: sepsis due to UTI. Pt presents to ED from home secondary to increased confusion over the last \"few days\" per daughter report. Past Medical History: 10/18 R femoral DVT present, on anticoagulant. dementia. Precautions: falls, bed/chair alarm     Home Living/PLOF:   Patient lives with daughters in a 2 story home with 1 step no HR on entry. Bed and bath on 2nd floor. Pt's bathroom has walk in shower no grab bar or seat. Pt was Independent with ADLs. Pt's daughters managed IADLs however pt was able to make himself simple breakfast and lunch. Pt's daughters work during the day and pt is alone at home. Pt performs functional mobility with no AD. Pt's daughter reports he is typically oriented to self, time, place and situation. Pain Level: pt c/o no pain this session     Hearing: WFL   Vision: WFL       Cognition: oriented x 1. Pt is able to say his name and the name of his daughter. Pt is unable to choose a year from a list of three. Pt is able to state that his is fall. Oriented to time, place and situation. Pt becomes disoriented again after 1-2 min.    Problem Solving: poor  Safety Awareness: poor    3 word recall: pt is able to repeat the three words immediately after hearing them however no recall after approx 5 seconds    Pt has poor processing, attention and sequencing of all tasks  Inconsistent ability to follow simple one step commands requiring Mod to Max cues  Upon being handed a sock pt is able to verbalize what it is and wear it goes however is unable to initiate or attempt placement of sock despite mod cues    UE Assessment:    ROM:  RUE: WFL limited at end range shoulder motions  LUE: [facility]

## 2018-10-19 NOTE — PROGRESS NOTES
P Quality Flow/Interdisciplinary Rounds Progress Note        Quality Flow Rounds held on October 19, 2018    Disciplines Attending:  Bedside Nurse, ,  and Nursing Unit Leadership    Klever Goodson was admitted on 10/18/2018 12:56 PM    Anticipated Discharge Date:  Expected Discharge Date: 10/22/18    Disposition:    Steve Score:  Steve Scale Score: 19    Readmission Risk              Risk of Unplanned Readmission:        18             Discussed patient goal for the day, patient clinical progression, and barriers to discharge. The following Goal(s) of the Day/Commitment(s) have been identified:  PT/OT to see, check ID plan.       Zackary Soler  October 19, 2018

## 2018-10-19 NOTE — PLAN OF CARE
Problem: Falls - Risk of:  Goal: Will remain free from falls  Will remain free from falls   Outcome: Met This Shift    Goal: Absence of physical injury  Absence of physical injury   Outcome: Met This Shift      Problem: Fluid Volume - Imbalance:  Goal: Absence of imbalanced fluid volume signs and symptoms  Absence of imbalanced fluid volume signs and symptoms   Outcome: Met This Shift      Problem: Physical Regulation:  Goal: Ability to maintain clinical measurements within normal limits will improve  Ability to maintain clinical measurements within normal limits will improve   Outcome: Ongoing    Goal: Diagnostic test results will improve  Diagnostic test results will improve   Outcome: Ongoing      Problem: Safety:  Goal: Ability to remain free from injury will improve  Ability to remain free from injury will improve   Outcome: Ongoing      Problem: Urinary Elimination:  Goal: Signs and symptoms of infection will decrease  Signs and symptoms of infection will decrease   Outcome: Ongoing    Goal: Ability to reestablish a normal urinary elimination pattern will improve - after catheter removal  Ability to reestablish a normal urinary elimination pattern will improve   Outcome: Not Met This Shift    Goal: Complications related to the disease process, condition or treatment will be avoided or minimized  Complications related to the disease process, condition or treatment will be avoided or minimized   Outcome: Not Met This Shift      Problem: Nutrition  Goal: Optimal nutrition therapy  Outcome: Met This Shift

## 2018-10-19 NOTE — PROGRESS NOTES
Patient had a reaction to Rocephin in the ER and received Benadryl. The patient is now ordered Rocephin 1 gram q12h. Dose given in ER will hold patient for 24 hours. ID is consulted, and can address when they see the patient. Thank Sarah Salvador Pharm. D 10/19/2018 8:38 AM

## 2018-10-19 NOTE — CONSULTS
BILITOT 0.5 10/18/2018     Lab Results   Component Value Date     10/18/2018    K 5.3 10/18/2018    CL 98 10/18/2018    CO2 21 10/18/2018    BUN 20 10/18/2018    CREATININE 0.8 10/18/2018    GFRAA >60 10/18/2018    LABGLOM >60 10/18/2018    GLUCOSE 94 10/18/2018    GLUCOSE 92 01/09/2012    PROT 5.8 10/18/2018    LABALBU 3.4 10/18/2018    LABALBU 4.6 01/09/2012    CALCIUM 8.0 10/18/2018    BILITOT 0.5 10/18/2018    ALKPHOS 104 10/18/2018    AST 32 10/18/2018    ALT 18 10/18/2018       Lab Results   Component Value Date    PROTIME 13.2 10/18/2018    INR 1.1 10/18/2018       Lab Results   Component Value Date    TSH 3.680 02/06/2018       Lab Results   Component Value Date    COLORU DARK YELLOW 10/18/2018    PHUR 6.5 10/18/2018    WBCUA >20 10/18/2018    RBCUA 10-20 10/18/2018    BACTERIA MANY 10/18/2018    CLARITYU SL CLOUDY 10/18/2018    SPECGRAV 1.020 10/18/2018    LEUKOCYTESUR LARGE 10/18/2018    UROBILINOGEN 0.2 10/18/2018    BILIRUBINUR SMALL 10/18/2018    BLOODU MODERATE 10/18/2018    GLUCOSEU Negative 10/18/2018    AMORPHOUS FEW 10/18/2018       No results found for: Aundra Prabhjot, H8TMMEAF, PHART, THGBART, YQR7BYQ, PO2ART, LJF8XVU  Radiology:  Noted    Microbiology:  Pending  No results for input(s): BC in the last 72 hours. Recent Labs      10/18/18   1453   ORG  Gram negative alexei*     No results for input(s): Isrrael Vieyra in the last 72 hours. No results for input(s): STREPNEUMAGU in the last 72 hours. No results for input(s): LP1UAG in the last 72 hours. No results for input(s): ASO in the last 72 hours. No results for input(s): CULTRESP in the last 72 hours. Assessment:  · Bacteriuria associated to chronic indwelling Landin catheter. Possible UTI  · DVT right femoral vein  · Possible ADR to ceftriaxone    Plan:    · Start Cefdinir  · Check cultures, baseline ESR, CRP  · Monitor labs  · Will follow with you    Thank you for having us see this patient in consultation.  I will be discussing this

## 2018-10-20 PROBLEM — C79.31 BRAIN METASTASES (HCC): Status: ACTIVE | Noted: 2018-10-20

## 2018-10-20 LAB
C-REACTIVE PROTEIN: 9.9 MG/DL (ref 0–0.4)
HCT VFR BLD CALC: 33.2 % (ref 37–54)
HEMOGLOBIN: 11 G/DL (ref 12.5–16.5)
MCH RBC QN AUTO: 27 PG (ref 26–35)
MCHC RBC AUTO-ENTMCNC: 33.1 % (ref 32–34.5)
MCV RBC AUTO: 81.6 FL (ref 80–99.9)
PDW BLD-RTO: 14.7 FL (ref 11.5–15)
PLATELET # BLD: 210 E9/L (ref 130–450)
PMV BLD AUTO: 10.2 FL (ref 7–12)
RBC # BLD: 4.07 E12/L (ref 3.8–5.8)
SEDIMENTATION RATE, ERYTHROCYTE: 23 MM/HR (ref 0–15)
WBC # BLD: 9.3 E9/L (ref 4.5–11.5)

## 2018-10-20 PROCEDURE — 6370000000 HC RX 637 (ALT 250 FOR IP): Performed by: SPECIALIST

## 2018-10-20 PROCEDURE — 2060000000 HC ICU INTERMEDIATE R&B

## 2018-10-20 PROCEDURE — 6370000000 HC RX 637 (ALT 250 FOR IP): Performed by: EMERGENCY MEDICINE

## 2018-10-20 PROCEDURE — 85651 RBC SED RATE NONAUTOMATED: CPT

## 2018-10-20 PROCEDURE — 36415 COLL VENOUS BLD VENIPUNCTURE: CPT

## 2018-10-20 PROCEDURE — 85027 COMPLETE CBC AUTOMATED: CPT

## 2018-10-20 PROCEDURE — 86140 C-REACTIVE PROTEIN: CPT

## 2018-10-20 PROCEDURE — 6370000000 HC RX 637 (ALT 250 FOR IP): Performed by: FAMILY MEDICINE

## 2018-10-20 PROCEDURE — 99233 SBSQ HOSP IP/OBS HIGH 50: CPT | Performed by: HOSPITALIST

## 2018-10-20 RX ORDER — CEFDINIR 300 MG/1
300 CAPSULE ORAL EVERY 12 HOURS SCHEDULED
Qty: 20 CAPSULE | Refills: 0 | Status: SHIPPED | OUTPATIENT
Start: 2018-10-20 | End: 2018-10-21 | Stop reason: HOSPADM

## 2018-10-20 RX ADMIN — TAMSULOSIN HYDROCHLORIDE 0.4 MG: 0.4 CAPSULE ORAL at 11:00

## 2018-10-20 RX ADMIN — CEFDINIR 300 MG: 300 CAPSULE ORAL at 11:00

## 2018-10-20 RX ADMIN — FINASTERIDE 5 MG: 5 TABLET, FILM COATED ORAL at 21:12

## 2018-10-20 RX ADMIN — APIXABAN 10 MG: 5 TABLET, FILM COATED ORAL at 11:00

## 2018-10-20 RX ADMIN — METOPROLOL TARTRATE 12.5 MG: 25 TABLET ORAL at 21:12

## 2018-10-20 RX ADMIN — AMLODIPINE BESYLATE 5 MG: 5 TABLET ORAL at 10:59

## 2018-10-20 RX ADMIN — LISINOPRIL 10 MG: 10 TABLET ORAL at 11:00

## 2018-10-20 RX ADMIN — LISINOPRIL 10 MG: 10 TABLET ORAL at 21:12

## 2018-10-20 RX ADMIN — METOPROLOL TARTRATE 12.5 MG: 25 TABLET ORAL at 10:59

## 2018-10-20 RX ADMIN — CEFDINIR 300 MG: 300 CAPSULE ORAL at 21:12

## 2018-10-20 ASSESSMENT — PAIN SCALES - GENERAL
PAINLEVEL_OUTOF10: 0

## 2018-10-20 NOTE — PROGRESS NOTES
Brittany Pino, Davy Cowart, and [place] son [place]. They will return Monday morning to resume care if patient still admitted. Admitting Date and Time: 10/18/2018 12:56 PM  Admit Dx: Sepsis due to urinary tract infection ([facility] [address] [A41.9, N39.0]  Sepsis due to urinary tract infection ([facility] [address] [A41.9, N39.0]    Subjective:    Pt feels okay at this time. Poor historian and very confused. No fevers or chills. Still not eating and drinking well. Poor coordination. Sitting up. Many family members present at bedside stating that he's not acting like his normal self. Requesting results from MRI. Per RN: Still confused today. Poor dietary intake even though he ate 3 meals. ROS: denies fever, chills, cp, sob, n/v, HA unless stated above.      cefdinir  300 mg Oral 2 times per day    amLODIPine  5 mg Oral Daily    finasteride  5 mg Oral QPM    lisinopril  10 mg Oral BID    metoprolol tartrate  12.5 mg Oral BID    tamsulosin  0.4 mg Oral Daily    sodium chloride flush  10 mL Intravenous 2 times per day       gadoteridol 14 mL ONCE PRN   sodium chloride flush 10 mL PRN   magnesium hydroxide 30 mL Daily PRN   ondansetron 4 mg Q6H PRN        Objective:    /76   Pulse 87   Temp 99.7 °F (37.6 °C) (Oral)   Resp 18   Ht 5' 7\" (1.702 m)   Wt 159 lb 4.8 oz (72.3 kg)   SpO2 98%   BMI 24.95 kg/m²   General Appearance: alert, disoriented and frail-appearing  Head: normocephalic and atraumatic  Pulmonary/Chest: clear to auscultation bilaterally- no wheezes, rales or rhonchi, normal air movement, no respiratory distress  Cardiovascular: normal rate, normal S1 and S2, no gallops, intact distal pulses and no carotid bruits  Abdomen: soft, non-tender, non-distended, normal bowel sounds, no masses or organomegaly      Recent Labs      10/18/18   1453   NA  130*   K  5.3*   CL  98   CO2  21*   BUN  20   CREATININE  0.8   GLUCOSE  94   CALCIUM  8.0*       Recent Labs

## 2018-10-21 LAB
ANION GAP SERPL CALCULATED.3IONS-SCNC: 13 MMOL/L (ref 7–16)
BUN BLDV-MCNC: 20 MG/DL (ref 8–23)
CALCIUM SERPL-MCNC: 7.9 MG/DL (ref 8.6–10.2)
CHLORIDE BLD-SCNC: 101 MMOL/L (ref 98–107)
CO2: 21 MMOL/L (ref 22–29)
CREAT SERPL-MCNC: 0.7 MG/DL (ref 0.7–1.2)
GFR AFRICAN AMERICAN: >60
GFR NON-AFRICAN AMERICAN: >60 ML/MIN/1.73
GLUCOSE BLD-MCNC: 89 MG/DL (ref 74–109)
HCT VFR BLD CALC: 42.4 % (ref 37–54)
HEMOGLOBIN: 13.7 G/DL (ref 12.5–16.5)
MCH RBC QN AUTO: 26.8 PG (ref 26–35)
MCHC RBC AUTO-ENTMCNC: 32.3 % (ref 32–34.5)
MCV RBC AUTO: 82.8 FL (ref 80–99.9)
ORGANISM: ABNORMAL
ORGANISM: ABNORMAL
PDW BLD-RTO: 15 FL (ref 11.5–15)
PLATELET # BLD: 358 E9/L (ref 130–450)
PMV BLD AUTO: 10 FL (ref 7–12)
POTASSIUM SERPL-SCNC: 4.3 MMOL/L (ref 3.5–5)
RBC # BLD: 5.12 E12/L (ref 3.8–5.8)
SODIUM BLD-SCNC: 135 MMOL/L (ref 132–146)
URINE CULTURE, ROUTINE: ABNORMAL
WBC # BLD: 14.1 E9/L (ref 4.5–11.5)

## 2018-10-21 PROCEDURE — 6360000002 HC RX W HCPCS: Performed by: INTERNAL MEDICINE

## 2018-10-21 PROCEDURE — 6370000000 HC RX 637 (ALT 250 FOR IP): Performed by: FAMILY MEDICINE

## 2018-10-21 PROCEDURE — 6370000000 HC RX 637 (ALT 250 FOR IP): Performed by: NURSE PRACTITIONER

## 2018-10-21 PROCEDURE — APPSS30 APP SPLIT SHARED TIME 16-30 MINUTES: Performed by: NURSE PRACTITIONER

## 2018-10-21 PROCEDURE — 2580000003 HC RX 258: Performed by: FAMILY MEDICINE

## 2018-10-21 PROCEDURE — 85027 COMPLETE CBC AUTOMATED: CPT

## 2018-10-21 PROCEDURE — 6370000000 HC RX 637 (ALT 250 FOR IP): Performed by: SPECIALIST

## 2018-10-21 PROCEDURE — 36415 COLL VENOUS BLD VENIPUNCTURE: CPT

## 2018-10-21 PROCEDURE — 99232 SBSQ HOSP IP/OBS MODERATE 35: CPT | Performed by: INTERNAL MEDICINE

## 2018-10-21 PROCEDURE — 2060000000 HC ICU INTERMEDIATE R&B

## 2018-10-21 PROCEDURE — 80048 BASIC METABOLIC PNL TOTAL CA: CPT

## 2018-10-21 PROCEDURE — 6370000000 HC RX 637 (ALT 250 FOR IP): Performed by: INTERNAL MEDICINE

## 2018-10-21 RX ORDER — LEVETIRACETAM 500 MG/1
500 TABLET ORAL 2 TIMES DAILY
Status: DISCONTINUED | OUTPATIENT
Start: 2018-10-21 | End: 2018-10-24 | Stop reason: HOSPADM

## 2018-10-21 RX ORDER — DEXAMETHASONE SODIUM PHOSPHATE 4 MG/ML
4 INJECTION, SOLUTION INTRA-ARTICULAR; INTRALESIONAL; INTRAMUSCULAR; INTRAVENOUS; SOFT TISSUE EVERY 6 HOURS
Status: DISCONTINUED | OUTPATIENT
Start: 2018-10-21 | End: 2018-10-23

## 2018-10-21 RX ORDER — LEVOFLOXACIN 500 MG/1
500 TABLET, FILM COATED ORAL DAILY
Qty: 10 TABLET | Refills: 0 | Status: SHIPPED | OUTPATIENT
Start: 2018-10-21 | End: 2018-10-31

## 2018-10-21 RX ORDER — LEVOFLOXACIN 500 MG/1
500 TABLET, FILM COATED ORAL DAILY
Status: DISCONTINUED | OUTPATIENT
Start: 2018-10-21 | End: 2018-10-24 | Stop reason: HOSPADM

## 2018-10-21 RX ADMIN — METOPROLOL TARTRATE 12.5 MG: 25 TABLET ORAL at 08:56

## 2018-10-21 RX ADMIN — Medication 10 ML: at 08:57

## 2018-10-21 RX ADMIN — LEVETIRACETAM 500 MG: 500 TABLET ORAL at 20:55

## 2018-10-21 RX ADMIN — LISINOPRIL 10 MG: 10 TABLET ORAL at 08:56

## 2018-10-21 RX ADMIN — LEVOFLOXACIN 500 MG: 500 TABLET, FILM COATED ORAL at 12:56

## 2018-10-21 RX ADMIN — LISINOPRIL 10 MG: 10 TABLET ORAL at 20:54

## 2018-10-21 RX ADMIN — FINASTERIDE 5 MG: 5 TABLET, FILM COATED ORAL at 17:53

## 2018-10-21 RX ADMIN — TAMSULOSIN HYDROCHLORIDE 0.4 MG: 0.4 CAPSULE ORAL at 08:56

## 2018-10-21 RX ADMIN — AMLODIPINE BESYLATE 5 MG: 5 TABLET ORAL at 08:56

## 2018-10-21 RX ADMIN — DEXAMETHASONE SODIUM PHOSPHATE 4 MG: 4 INJECTION, SOLUTION INTRAMUSCULAR; INTRAVENOUS at 18:02

## 2018-10-21 RX ADMIN — METOPROLOL TARTRATE 12.5 MG: 25 TABLET ORAL at 20:54

## 2018-10-21 RX ADMIN — CEFDINIR 300 MG: 300 CAPSULE ORAL at 08:56

## 2018-10-21 ASSESSMENT — PAIN SCALES - GENERAL
PAINLEVEL_OUTOF10: 0

## 2018-10-21 NOTE — PROGRESS NOTES
[facility]  [facility]  Progress Note    SUBJECTIVE:  Chief Complaint   Patient presents with    Altered Mental Status     hx dementia family states confusion more than normal.  jalloh cath for 3 months    Leg Swelling     RLE beginning a few days ago per family     Alert, confused. No pain. Eating well  No nausea, vomiting, diarrhea. Up in chair  Remains afebrile    Review of systems:  As stated above in the chief complaint, otherwise negative. Medications:  Scheduled Meds:   cefdinir  300 mg Oral 2 times per day    amLODIPine  5 mg Oral Daily    finasteride  5 mg Oral QPM    lisinopril  10 mg Oral BID    metoprolol tartrate  12.5 mg Oral BID    tamsulosin  0.4 mg Oral Daily    sodium chloride flush  10 mL Intravenous 2 times per day     Continuous Infusions:   sodium chloride 75 mL/hr at 10/19/18 2255     PRN Meds:gadoteridol, sodium chloride flush, magnesium hydroxide, ondansetron    OBJECTIVE:  BP (!) 140/84   Pulse 88   Temp 97.6 °F (36.4 °C) (Oral)   Resp 16   Ht 5' 7\" (1.702 m)   Wt 159 lb 4.8 oz (72.3 kg)   SpO2 99%   BMI 24.95 kg/m²   Temp  Av.3 °F (36.8 °C)  Min: 97.6 °F (36.4 °C)  Max: 99.7 °F (37.6 °C)  Constitutional: The patient is awake, alert, and better oriented. Family present. Skin: Warm and dry. No rashes were noted. HEENT: Eyes show round, and reactive pupils. No jaundice. Moist mucous membranes, no ulcerations, no thrush. Neck: Supple to movements. Chest: No wheezing, crackles, or rhonchi. Cardiovascular: S1 and S2 are rhythmic and regular. No murmurs appreciated. Abdomen: Positive bowel sounds to auscultation. Benign to palpation. No masses felt. Extremities: No clubbing, no cyanosis.  RLE +1 edema  Lines: peripheral  F/c draining clear yellow urine    Laboratory and Tests Review:  Lab Results   Component Value Date    WBC 9.3 10/20/2018    WBC 10.7 10/18/2018    WBC 10.4 2018    HGB 11.0 (L) 10/20/2018    HCT 33.2 (L) 10/20/2018    MCV 81.6 10/20/2018     10/20/2018     Lab Results   Component Value Date    NEUTROABS 8.28 (H) 10/18/2018    NEUTROABS 7.59 (H) 09/14/2018    NEUTROABS 5.00 08/03/2018     Lab Results   Component Value Date    CRP 9.9 (H) 10/20/2018     No results found for: CRPHS  Lab Results   Component Value Date    SEDRATE 23 (H) 10/20/2018     Lab Results   Component Value Date    ALT 18 10/18/2018    AST 32 10/18/2018    ALKPHOS 104 10/18/2018    BILITOT 0.5 10/18/2018     Lab Results   Component Value Date     10/18/2018    K 5.3 10/18/2018    CL 98 10/18/2018    CO2 21 10/18/2018    BUN 20 10/18/2018    CREATININE 0.8 10/18/2018    CREATININE 1.0 09/14/2018    CREATININE 1.0 09/07/2018    GFRAA >60 10/18/2018    LABGLOM >60 10/18/2018    GLUCOSE 94 10/18/2018    GLUCOSE 92 01/09/2012    PROT 5.8 10/18/2018    LABALBU 3.4 10/18/2018    LABALBU 4.6 01/09/2012    CALCIUM 8.0 10/18/2018    BILITOT 0.5 10/18/2018    ALKPHOS 104 10/18/2018    AST 32 10/18/2018    ALT 18 10/18/2018     Radiology:      Microbiology:   Blood cultures negative so far  Urine culture >100K Klebsiella oxytoca. >100K Ox+ GNR    ASSESSMENT:  · Bacteriuria associated to chronic indwelling Landin catheter. Possible UTI  · DVT right femoral vein  · Possible ADR to Ceftriaxone  · Diffuse metastatic disease per CT abd/pelvis - mets melanoma per 9/18 path lung mass     Plan:    · Change antibiotic to Levofloxacin. Reconciled  · Vascular to see for possible IVC filter  · Patient can be discharged from ID standpoint    April Juliette Lu  9:28 AM  10/21/2018     Patient seen and examined. I had a face to face encounter with the patient. Agree with exam, assessment and plan as outlined above. Addition and corrections were done as deemed appropriate. My exam and plan include: Patient can be discharged from ID standpoint.     Lino Hill  10/21/2018

## 2018-10-22 ENCOUNTER — ANESTHESIA EVENT (OUTPATIENT)
Dept: OPERATING ROOM | Age: 78
DRG: 040 | End: 2018-10-22
Payer: MEDICARE

## 2018-10-22 ENCOUNTER — HOSPITAL ENCOUNTER (OUTPATIENT)
Dept: RADIATION ONCOLOGY | Age: 78
Discharge: HOME | End: 2018-10-22
Payer: MEDICARE

## 2018-10-22 DIAGNOSIS — C80.1 CANCER (HCC): ICD-10-CM

## 2018-10-22 DIAGNOSIS — R11.0 NAUSEA: Primary | ICD-10-CM

## 2018-10-22 PROCEDURE — 6370000000 HC RX 637 (ALT 250 FOR IP): Performed by: INTERNAL MEDICINE

## 2018-10-22 PROCEDURE — 99221 1ST HOSP IP/OBS SF/LOW 40: CPT | Performed by: SURGERY

## 2018-10-22 PROCEDURE — 6370000000 HC RX 637 (ALT 250 FOR IP): Performed by: NURSE PRACTITIONER

## 2018-10-22 PROCEDURE — 2580000003 HC RX 258: Performed by: FAMILY MEDICINE

## 2018-10-22 PROCEDURE — 6370000000 HC RX 637 (ALT 250 FOR IP): Performed by: FAMILY MEDICINE

## 2018-10-22 PROCEDURE — 97535 SELF CARE MNGMENT TRAINING: CPT

## 2018-10-22 PROCEDURE — 1200000000 HC SEMI PRIVATE

## 2018-10-22 PROCEDURE — 97530 THERAPEUTIC ACTIVITIES: CPT

## 2018-10-22 PROCEDURE — 6360000002 HC RX W HCPCS: Performed by: SURGERY

## 2018-10-22 PROCEDURE — 77334 RADIATION TREATMENT AID(S): CPT

## 2018-10-22 PROCEDURE — 6360000002 HC RX W HCPCS: Performed by: INTERNAL MEDICINE

## 2018-10-22 PROCEDURE — 99221 1ST HOSP IP/OBS SF/LOW 40: CPT | Performed by: RADIOLOGY

## 2018-10-22 RX ADMIN — DEXAMETHASONE SODIUM PHOSPHATE 4 MG: 4 INJECTION, SOLUTION INTRAMUSCULAR; INTRAVENOUS at 00:31

## 2018-10-22 RX ADMIN — LISINOPRIL 10 MG: 10 TABLET ORAL at 20:24

## 2018-10-22 RX ADMIN — TAMSULOSIN HYDROCHLORIDE 0.4 MG: 0.4 CAPSULE ORAL at 08:59

## 2018-10-22 RX ADMIN — AMLODIPINE BESYLATE 5 MG: 5 TABLET ORAL at 08:59

## 2018-10-22 RX ADMIN — SODIUM CHLORIDE: 9 INJECTION, SOLUTION INTRAVENOUS at 19:31

## 2018-10-22 RX ADMIN — LEVETIRACETAM 500 MG: 500 TABLET ORAL at 20:24

## 2018-10-22 RX ADMIN — SODIUM CHLORIDE: 9 INJECTION, SOLUTION INTRAVENOUS at 05:59

## 2018-10-22 RX ADMIN — LISINOPRIL 10 MG: 10 TABLET ORAL at 08:59

## 2018-10-22 RX ADMIN — METOPROLOL TARTRATE 12.5 MG: 25 TABLET ORAL at 20:24

## 2018-10-22 RX ADMIN — DEXAMETHASONE SODIUM PHOSPHATE 4 MG: 4 INJECTION, SOLUTION INTRAMUSCULAR; INTRAVENOUS at 06:30

## 2018-10-22 RX ADMIN — FINASTERIDE 5 MG: 5 TABLET, FILM COATED ORAL at 20:24

## 2018-10-22 RX ADMIN — DEXAMETHASONE SODIUM PHOSPHATE 4 MG: 4 INJECTION, SOLUTION INTRAMUSCULAR; INTRAVENOUS at 11:55

## 2018-10-22 RX ADMIN — METOPROLOL TARTRATE 12.5 MG: 25 TABLET ORAL at 08:59

## 2018-10-22 RX ADMIN — DEXAMETHASONE SODIUM PHOSPHATE 4 MG: 4 INJECTION, SOLUTION INTRAMUSCULAR; INTRAVENOUS at 20:24

## 2018-10-22 RX ADMIN — ENOXAPARIN SODIUM 40 MG: 40 INJECTION, SOLUTION INTRAVENOUS; SUBCUTANEOUS at 10:22

## 2018-10-22 RX ADMIN — LEVOFLOXACIN 500 MG: 500 TABLET, FILM COATED ORAL at 08:59

## 2018-10-22 RX ADMIN — LEVETIRACETAM 500 MG: 500 TABLET ORAL at 08:59

## 2018-10-22 ASSESSMENT — PAIN SCALES - GENERAL
PAINLEVEL_OUTOF10: 0

## 2018-10-22 NOTE — PROGRESS NOTES
end of session. Daughter present during tx. Pt required max vc and tactile cues for hand placement during transfers. Education:  Hand placement during transfers and safe LE dressing techniques    Equipment Recommendations:  Continue to assess     AM-PAC Inpatient Daily Activity Raw Score:  13/24    Pain Level: No complaints of pain   Additional Notes:  Fatigue with activity  Patient has made good progress during treatment sessions toward set goals. [x] Continue with current OT Plan of care.   [] Prepare for Discharge    Bull STARR/BLU [id number]    Total Tx Time: 15

## 2018-10-22 NOTE — ANESTHESIA PRE PROCEDURE
mg at 10/22/18 1155    levETIRAcetam (KEPPRA) tablet 500 mg  500 mg Oral BID Yamileth Stacy MD   500 mg at 10/22/18 0859    gadoteridol (PROHANCE) injection 14 mL  14 mL Intravenous ONCE PRN Angélica Chavez MD        amLODIPine (NORVASC) tablet 5 mg  5 mg Oral Daily Chip Reji, DO   5 mg at 10/22/18 0859    finasteride (PROSCAR) tablet 5 mg  5 mg Oral QPM Chip Reji, DO   5 mg at 10/21/18 1753    lisinopril (PRINIVIL;ZESTRIL) tablet 10 mg  10 mg Oral BID Chip Reji, DO   10 mg at 10/22/18 0859    metoprolol tartrate (LOPRESSOR) tablet 12.5 mg  12.5 mg Oral BID Chip Reji, DO   12.5 mg at 10/22/18 0859    tamsulosin (FLOMAX) capsule 0.4 mg  0.4 mg Oral Daily Rafael Oliva, DO   0.4 mg at 10/22/18 0859    sodium chloride flush 0.9 % injection 10 mL  10 mL Intravenous 2 times per day Chip Reji, DO   10 mL at 10/21/18 0857    sodium chloride flush 0.9 % injection 10 mL  10 mL Intravenous PRN Chip Reji, DO        magnesium hydroxide (MILK OF MAGNESIA) 400 MG/5ML suspension 30 mL  30 mL Oral Daily PRN Chip Reji, DO        ondansetron (ZOFRAN) injection 4 mg  4 mg Intravenous Q6H PRN Chip Reji, DO        0.9 % sodium chloride infusion   Intravenous Continuous Chip Reji, DO 75 mL/hr at 10/22/18 0559         Allergies:     Allergies   Allergen Reactions    Rocephin [Ceftriaxone]      Given for the first time in ED and broke out in hives       Problem List:    Patient Active Problem List   Diagnosis Code    HTN (hypertension) I10    Hyperlipidemia E78.5    Prostate cancer ([address] C61    Thrombocytopenia (HCC) D69.6    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, 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    Vitamin D deficiency E55.9    Murmur R01.1    Mitral regurgitation I34.0    Colon polyp K63.5    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    Sepsis due to urinary tract infection (HCC) A41.9, N39.0    Moderate protein-calorie malnutrition (HCC) E44.0    Brain metastases (HCC) C79.31    Acute deep vein thrombosis (DVT) of right lower extremity (HCC) I82.401    Malignant melanoma (HCC) C43.9    Bone metastasis (HCC) C79.51       Past Medical History:        Diagnosis Date    Brain metastases ([address] 10/20/2018    Hypertension     Urinary retention        Past Surgical History:        Procedure Laterality Date    APPENDECTOMY      COLONOSCOPY      TONSILLECTOMY         Social History:    Social History   Substance Use Topics    Smoking status: Never Smoker    Smokeless tobacco: Never Used    Alcohol use No                                Counseling given: Not Answered      Vital Signs (Current):   Vitals:    10/21/18 1526 10/21/18 2332 10/22/18 0540 10/22/18 0802   BP: 133/76 134/77  126/82   Pulse: 89 83  84   Resp: 18 18  16   Temp: 36.7 °C (98 °F) 36.8 °C (98.2 °F)  36.6 °C (97.9 °F)   TempSrc: Oral Axillary  Axillary   SpO2: 95% 99%  96%   Weight:   148 lb 3.2 oz (67.2 kg)    Height:                                                  BP Readings from Last 3 Encounters:   10/22/18 126/82   09/24/18 133/76   09/14/18 138/77       NPO Status:  Advised to be NPO at [facility] on 10/23/18                                                                               BMI:   Wt Readings from Last 3 Encounters:   10/22/18 148 lb 3.2 oz (67.2 kg)   09/14/18 164 lb (74.4 kg)   08/27/18 146 lb (66.2 kg)     Body mass index is 23.21 kg/m².     CBC:   Lab Results   Component Value Date    WBC 14.1 10/21/2018    RBC 5.12 10/21/2018    HGB 13.7 10/21/2018    HCT 42.4 10/21/2018    MCV 82.8 10/21/2018    RDW 15.0 10/21/2018     10/21/2018       CMP:   Lab Results   Component Value Date     10/21/2018    K 4.3 10/21/2018    K 5.3 10/18/2018     10/21/2018    CO2 21 10/21/2018    BUN 20 10/21/2018    CREATININE 0.7 10/21/2018    GFRAA >60 10/21/2018    LABGLOM >60 10/21/2018    GLUCOSE 89 10/21/2018    GLUCOSE 92 01/09/2012    PROT 5.8 10/18/2018    CALCIUM 7.9 10/21/2018    BILITOT 0.5 10/18/2018 appears intact.      Right Atrium   Right atrium is normal in size.      Mitral Valve   Mild prolapse of the posterior mitral valve leaflet   Moderate-severe mitral regurgitation is present.   Coanda effect, suggesting at least moderate mitral regurgitation.   Mild thickening of the mitral valve leaflets      Tricuspid Valve   The tricuspid valve appears structurally normal.   Mild tricuspid regurgitation.  RVSP is 38 mmHg.      Aortic Valve   The aortic valve is trileaflet.   Aortic valve opens well.   The aortic valve appears mildly sclerotic.   Mild aortic regurgitation is noted.      Pulmonic Valve   Pulmonic valve is structurally normal.   Physiologic and/or trace pulmonic regurgitation present.      Pericardial Effusion   No evidence of pericardial thickening/calcification present.   No evidence of pericardial effusion.      Aorta   Aortic root dimension within normal limits.   Aorta appears sclerotic and/or calcified.      Miscellaneous   Normal Inferior Vena Cava diameter and respiratory variation      Conclusions      Summary   Left ventricle grossly normal in size.   Borderline left ventricular concentric hypertrophy noted.   Normal LV segmental wall motion.   Estimated left ventricular ejection fraction is 65%.   Normal left ventricular diastolic filling velocities for age.   Estimated wedge pressure is 14 mmHg.   The left atrium is mild-moderately dilated.   Right ventricular segmental wall motion is normal.   Mild prolapse of the posterior mitral valve leaflet   Moderate-severe mitral regurgitation is present.   Mild aortic regurgitation is noted.   Mild tricuspid regurgitation.  RVSP is 38 mmHg.   Physiologic and/or trace pulmonic regurgitation present.   Technically good quality study.   No previous echo for comparison.   Suggest clinical correlation.     Anesthesia Evaluation  Patient summary reviewed and Nursing notes reviewed no history of anesthetic complications:   Airway: Mallampati: IV  TM

## 2018-10-22 NOTE — PROGRESS NOTES
Subjective: The patient is awake and alert. No problems overnight. confused    Objective:    /82   Pulse 84   Temp 97.9 °F (36.6 °C) (Axillary)   Resp 16   Ht 5' 7\" (1.702 m)   Wt 148 lb 3.2 oz (67.2 kg)   SpO2 96%   BMI 23.21 kg/m²     Heart:  RRR, no murmurs, gallops, or rubs.   Lungs: Decreased BS at the bases  Abd: bowel sounds present, nontender, nondistended, no masses  Extrem:  No clubbing, cyanosis, or edema    Labs:  CBC:   Lab Results   Component Value Date    WBC 14.1 10/21/2018    RBC 5.12 10/21/2018    HGB 13.7 10/21/2018    HCT 42.4 10/21/2018    MCV 82.8 10/21/2018    MCH 26.8 10/21/2018    MCHC 32.3 10/21/2018    RDW 15.0 10/21/2018     10/21/2018    MPV 10.0 10/21/2018     CMP:    Lab Results   Component Value Date     10/21/2018    K 4.3 10/21/2018    K 5.3 10/18/2018     10/21/2018    CO2 21 10/21/2018    BUN 20 10/21/2018    CREATININE 0.7 10/21/2018    GFRAA >60 10/21/2018    LABGLOM >60 10/21/2018    GLUCOSE 89 10/21/2018    GLUCOSE 92 01/09/2012    PROT 5.8 10/18/2018    LABALBU 3.4 10/18/2018    LABALBU 4.6 01/09/2012    CALCIUM 7.9 10/21/2018    BILITOT 0.5 10/18/2018    ALKPHOS 104 10/18/2018    AST 32 10/18/2018    ALT 18 10/18/2018     PT/INR:    Lab Results   Component Value Date    PROTIME 13.2 10/18/2018    INR 1.1 10/18/2018          Current Facility-Administered Medications:     enoxaparin (LOVENOX) injection 40 mg, 40 mg, Subcutaneous, Daily, Evelina King MD, 40 mg at 10/22/18 1022    levofloxacin (LEVAQUIN) tablet 500 mg, 500 mg, Oral, Daily, April BONITA Arroyo - CNP, 500 mg at 10/22/18 0859    dexamethasone (DECADRON) injection 4 mg, 4 mg, Intravenous, Q6H, Santa Barajas MD, 4 mg at 10/22/18 1155    levETIRAcetam (KEPPRA) tablet 500 mg, 500 mg, Oral, BID, Santa Barajas MD, 500 mg at 10/22/18 0859    gadoteridol (PROHANCE) injection 14 mL, 14 mL, Intravenous, ONCE PRN, Shobha Cowart MD    amLODIPine ([address])

## 2018-10-22 NOTE — CONSULTS
500 mg Oral Daily    dexamethasone  4 mg Intravenous Q6H    levETIRAcetam  500 mg Oral BID    amLODIPine  5 mg Oral Daily    finasteride  5 mg Oral QPM    lisinopril  10 mg Oral BID    metoprolol tartrate  12.5 mg Oral BID    tamsulosin  0.4 mg Oral Daily    sodium chloride flush  10 mL Intravenous 2 times per day      Allergies:  Rocephin [ceftriaxone]  Social History     Social History    Marital status:      Spouse name: N/A    Number of children: N/A    Years of education: N/A     Occupational History    Not on file.      Social History Main Topics    Smoking status: Never Smoker    Smokeless tobacco: Never Used    Alcohol use No    Drug use: No    Sexual activity: Not on file     Other Topics Concern    Not on file     Social History Narrative    No narrative on file     Family history  Denies history of DVT or PE    PHYSICAL EXAM:    /76   Pulse 89   Temp 98 °F (36.7 °C) (Oral)   Resp 18   Ht 5' 7\" (1.702 m)   Wt 159 lb 4.8 oz (72.3 kg)   SpO2 95%   BMI 24.95 kg/m²   CONSTITUTIONAL:   Awake, alert, cooperative  PSYCHIATRIC :  Oriented to time, place and person      Appears to have Appropriate insight to disease process  EYES: Lids and lashes normal  ENT:  External ears and nose without lesions   Hearing deficits noted  NECK: Supple, symmetrical, trachea midline   Thyroid goiter not appreciated  LUNGS:  No increased work of breathing                 Good respiratory excursion  CARDIOVASCULAR:  regular rate and rhythm   ABDOMEN:  soft, non-distended, non-tender   Hernias no tnoted   Aorta is not palpable  SKIN:   Normal skin color   Texture and turgor normal, no induration  EXTREMITIES:   R UE 5/5 strength   No cyanosis noted in nail beds  L UE 5/5 strength   No cyanosis noted in nail beds  R LE Edema present   Open wounds absent   L LE Edema absent   Open wound absent  R femoral 2+ L femoral 2+   R posterior tibial 2+ L posterior tibial 2+   R dorsalis pedis 2+ L dorsalis pedis 2+     LABS:    Lab Results   Component Value Date    WBC 14.1 (H) 10/21/2018    HGB 13.7 10/21/2018    HCT 42.4 10/21/2018     10/21/2018    PROTIME 13.2 (H) 10/18/2018    INR 1.1 10/18/2018    K 4.3 10/21/2018    BUN 20 10/21/2018    CREATININE 0.7 10/21/2018     Radiology pertinent to vascular surgery evaluation reviewed  R LE Venous US  · DVT femoral vein    Assesment/Plan  Right LE DVT  Liver ca with brain mets  · Etiology is likely hypercoagulable state  · Yes:  indication for placement of IVC Filter  I reviewed the procedure with the patient and family as available. I discussed the procedure, risks, benefits, complications, and alternatives of the procedure. They understand and consent.   All questions were answered  I discussed with Dr. Kathleen Olvera from oncology who felt it was reasonable to place the patient on prophylactic dose Lovenox  · Elevate Bilateral LE while in bed or sitting  · Tubigrip  Bilateral LE while in the hospital  · Call if patient develops worsening of swelling or wounds  · discussed with patient pathophysiology of DVT, PE, and thrombophlebitis   · All ?s answered    I will call his daughter Harriet Nielson

## 2018-10-23 ENCOUNTER — APPOINTMENT (OUTPATIENT)
Dept: GENERAL RADIOLOGY | Age: 78
DRG: 040 | End: 2018-10-23
Payer: MEDICARE

## 2018-10-23 ENCOUNTER — ANESTHESIA (OUTPATIENT)
Dept: OPERATING ROOM | Age: 78
DRG: 040 | End: 2018-10-23
Payer: MEDICARE

## 2018-10-23 VITALS — SYSTOLIC BLOOD PRESSURE: 130 MMHG | OXYGEN SATURATION: 98 % | DIASTOLIC BLOOD PRESSURE: 86 MMHG

## 2018-10-23 LAB
ABO/RH: NORMAL
ANTIBODY SCREEN: NORMAL
BLOOD CULTURE, ROUTINE: NORMAL
CULTURE, BLOOD 2: NORMAL

## 2018-10-23 PROCEDURE — 2580000003 HC RX 258: Performed by: NURSE ANESTHETIST, CERTIFIED REGISTERED

## 2018-10-23 PROCEDURE — 7100000010 HC PHASE II RECOVERY - FIRST 15 MIN: Performed by: SURGERY

## 2018-10-23 PROCEDURE — 2580000003 HC RX 258: Performed by: FAMILY MEDICINE

## 2018-10-23 PROCEDURE — 1200000000 HC SEMI PRIVATE

## 2018-10-23 PROCEDURE — 6360000002 HC RX W HCPCS: Performed by: SURGERY

## 2018-10-23 PROCEDURE — 3700000000 HC ANESTHESIA ATTENDED CARE: Performed by: SURGERY

## 2018-10-23 PROCEDURE — 6370000000 HC RX 637 (ALT 250 FOR IP): Performed by: NURSE PRACTITIONER

## 2018-10-23 PROCEDURE — 2500000003 HC RX 250 WO HCPCS: Performed by: SURGERY

## 2018-10-23 PROCEDURE — 3700000001 HC ADD 15 MINUTES (ANESTHESIA): Performed by: SURGERY

## 2018-10-23 PROCEDURE — C1880 VENA CAVA FILTER: HCPCS | Performed by: SURGERY

## 2018-10-23 PROCEDURE — 36415 COLL VENOUS BLD VENIPUNCTURE: CPT

## 2018-10-23 PROCEDURE — 6360000002 HC RX W HCPCS: Performed by: NURSE ANESTHETIST, CERTIFIED REGISTERED

## 2018-10-23 PROCEDURE — 6370000000 HC RX 637 (ALT 250 FOR IP): Performed by: INTERNAL MEDICINE

## 2018-10-23 PROCEDURE — C1769 GUIDE WIRE: HCPCS | Performed by: SURGERY

## 2018-10-23 PROCEDURE — 3600000012 HC SURGERY LEVEL 2 ADDTL 15MIN: Performed by: SURGERY

## 2018-10-23 PROCEDURE — 86900 BLOOD TYPING SEROLOGIC ABO: CPT

## 2018-10-23 PROCEDURE — 3209999900 FLUORO FOR SURGICAL PROCEDURES

## 2018-10-23 PROCEDURE — 86850 RBC ANTIBODY SCREEN: CPT

## 2018-10-23 PROCEDURE — 06H03DZ INSERTION OF INTRALUMINAL DEVICE INTO INFERIOR VENA CAVA, PERCUTANEOUS APPROACH: ICD-10-PCS | Performed by: SURGERY

## 2018-10-23 PROCEDURE — 77334 RADIATION TREATMENT AID(S): CPT

## 2018-10-23 PROCEDURE — 86901 BLOOD TYPING SEROLOGIC RH(D): CPT

## 2018-10-23 PROCEDURE — 97530 THERAPEUTIC ACTIVITIES: CPT

## 2018-10-23 PROCEDURE — 77307 TELETHX ISODOSE PLAN CPLX: CPT

## 2018-10-23 PROCEDURE — 2709999900 HC NON-CHARGEABLE SUPPLY: Performed by: SURGERY

## 2018-10-23 PROCEDURE — 99232 SBSQ HOSP IP/OBS MODERATE 35: CPT | Performed by: SURGERY

## 2018-10-23 PROCEDURE — 37191 INS ENDOVAS VENA CAVA FILTR: CPT | Performed by: SURGERY

## 2018-10-23 PROCEDURE — 6360000002 HC RX W HCPCS: Performed by: INTERNAL MEDICINE

## 2018-10-23 PROCEDURE — 6370000000 HC RX 637 (ALT 250 FOR IP): Performed by: FAMILY MEDICINE

## 2018-10-23 PROCEDURE — 7100000011 HC PHASE II RECOVERY - ADDTL 15 MIN: Performed by: SURGERY

## 2018-10-23 PROCEDURE — 3600000002 HC SURGERY LEVEL 2 BASE: Performed by: SURGERY

## 2018-10-23 DEVICE — FILTER VASC L50MM DIA30MM INTRO 7FR L65CM VENA CAVA FEM W: Type: IMPLANTABLE DEVICE | Site: GROIN | Status: FUNCTIONAL

## 2018-10-23 RX ORDER — FENTANYL CITRATE 50 UG/ML
INJECTION, SOLUTION INTRAMUSCULAR; INTRAVENOUS PRN
Status: DISCONTINUED | OUTPATIENT
Start: 2018-10-23 | End: 2018-10-23 | Stop reason: SDUPTHER

## 2018-10-23 RX ORDER — CLINDAMYCIN PHOSPHATE 600 MG/50ML
600 INJECTION INTRAVENOUS ONCE
Status: COMPLETED | OUTPATIENT
Start: 2018-10-23 | End: 2018-10-23

## 2018-10-23 RX ORDER — BUPIVACAINE HYDROCHLORIDE 2.5 MG/ML
INJECTION, SOLUTION EPIDURAL; INFILTRATION; INTRACAUDAL PRN
Status: DISCONTINUED | OUTPATIENT
Start: 2018-10-23 | End: 2018-10-23 | Stop reason: HOSPADM

## 2018-10-23 RX ORDER — HEPARIN SODIUM (PORCINE) LOCK FLUSH IV SOLN 100 UNIT/ML 100 UNIT/ML
SOLUTION INTRAVENOUS PRN
Status: DISCONTINUED | OUTPATIENT
Start: 2018-10-23 | End: 2018-10-23 | Stop reason: HOSPADM

## 2018-10-23 RX ORDER — DEXAMETHASONE SODIUM PHOSPHATE 4 MG/ML
4 INJECTION, SOLUTION INTRA-ARTICULAR; INTRALESIONAL; INTRAMUSCULAR; INTRAVENOUS; SOFT TISSUE EVERY 12 HOURS
Status: DISCONTINUED | OUTPATIENT
Start: 2018-10-24 | End: 2018-10-24 | Stop reason: HOSPADM

## 2018-10-23 RX ORDER — PROPOFOL 10 MG/ML
INJECTION, EMULSION INTRAVENOUS CONTINUOUS PRN
Status: DISCONTINUED | OUTPATIENT
Start: 2018-10-23 | End: 2018-10-23 | Stop reason: SDUPTHER

## 2018-10-23 RX ORDER — PROPOFOL 10 MG/ML
INJECTION, EMULSION INTRAVENOUS PRN
Status: DISCONTINUED | OUTPATIENT
Start: 2018-10-23 | End: 2018-10-23 | Stop reason: SDUPTHER

## 2018-10-23 RX ORDER — LIDOCAINE HYDROCHLORIDE 10 MG/ML
INJECTION, SOLUTION INFILTRATION; PERINEURAL PRN
Status: DISCONTINUED | OUTPATIENT
Start: 2018-10-23 | End: 2018-10-23 | Stop reason: HOSPADM

## 2018-10-23 RX ORDER — SODIUM CHLORIDE 9 MG/ML
INJECTION, SOLUTION INTRAVENOUS CONTINUOUS PRN
Status: DISCONTINUED | OUTPATIENT
Start: 2018-10-23 | End: 2018-10-23 | Stop reason: SDUPTHER

## 2018-10-23 RX ORDER — HEPARIN SODIUM 1000 [USP'U]/ML
INJECTION, SOLUTION INTRAVENOUS; SUBCUTANEOUS PRN
Status: DISCONTINUED | OUTPATIENT
Start: 2018-10-23 | End: 2018-10-23 | Stop reason: HOSPADM

## 2018-10-23 RX ADMIN — LEVOFLOXACIN 500 MG: 500 TABLET, FILM COATED ORAL at 08:38

## 2018-10-23 RX ADMIN — LEVETIRACETAM 500 MG: 500 TABLET ORAL at 21:59

## 2018-10-23 RX ADMIN — SODIUM CHLORIDE: 9 INJECTION, SOLUTION INTRAVENOUS at 17:07

## 2018-10-23 RX ADMIN — SODIUM CHLORIDE: 9 INJECTION, SOLUTION INTRAVENOUS at 08:42

## 2018-10-23 RX ADMIN — LEVETIRACETAM 500 MG: 500 TABLET ORAL at 08:38

## 2018-10-23 RX ADMIN — DEXAMETHASONE SODIUM PHOSPHATE 4 MG: 4 INJECTION, SOLUTION INTRAMUSCULAR; INTRAVENOUS at 08:39

## 2018-10-23 RX ADMIN — FINASTERIDE 5 MG: 5 TABLET, FILM COATED ORAL at 21:59

## 2018-10-23 RX ADMIN — LISINOPRIL 10 MG: 10 TABLET ORAL at 08:38

## 2018-10-23 RX ADMIN — METOPROLOL TARTRATE 12.5 MG: 25 TABLET ORAL at 21:59

## 2018-10-23 RX ADMIN — DEXAMETHASONE SODIUM PHOSPHATE 4 MG: 4 INJECTION, SOLUTION INTRAMUSCULAR; INTRAVENOUS at 14:02

## 2018-10-23 RX ADMIN — AMLODIPINE BESYLATE 5 MG: 5 TABLET ORAL at 08:37

## 2018-10-23 RX ADMIN — DEXAMETHASONE SODIUM PHOSPHATE 4 MG: 4 INJECTION, SOLUTION INTRAMUSCULAR; INTRAVENOUS at 02:13

## 2018-10-23 RX ADMIN — CLINDAMYCIN PHOSPHATE 600 MG: 600 INJECTION INTRAVENOUS at 16:43

## 2018-10-23 RX ADMIN — PROPOFOL 50 MG: 10 INJECTION, EMULSION INTRAVENOUS at 17:16

## 2018-10-23 RX ADMIN — PROPOFOL 100 MCG/KG/MIN: 10 INJECTION, EMULSION INTRAVENOUS at 17:17

## 2018-10-23 RX ADMIN — LISINOPRIL 10 MG: 10 TABLET ORAL at 21:58

## 2018-10-23 RX ADMIN — METOPROLOL TARTRATE 12.5 MG: 25 TABLET ORAL at 08:38

## 2018-10-23 RX ADMIN — PROPOFOL 50 MG: 10 INJECTION, EMULSION INTRAVENOUS at 17:17

## 2018-10-23 RX ADMIN — FENTANYL CITRATE 50 MCG: 50 INJECTION, SOLUTION INTRAMUSCULAR; INTRAVENOUS at 17:16

## 2018-10-23 RX ADMIN — FENTANYL CITRATE 50 MCG: 50 INJECTION, SOLUTION INTRAMUSCULAR; INTRAVENOUS at 17:22

## 2018-10-23 RX ADMIN — TAMSULOSIN HYDROCHLORIDE 0.4 MG: 0.4 CAPSULE ORAL at 08:38

## 2018-10-23 ASSESSMENT — PAIN DESCRIPTION - ORIENTATION
ORIENTATION: RIGHT

## 2018-10-23 ASSESSMENT — PULMONARY FUNCTION TESTS
PIF_VALUE: 0
PIF_VALUE: 1
PIF_VALUE: 0
PIF_VALUE: 1
PIF_VALUE: 0
PIF_VALUE: 1
PIF_VALUE: 0
PIF_VALUE: 1
PIF_VALUE: 0
PIF_VALUE: 1
PIF_VALUE: 0
PIF_VALUE: 1

## 2018-10-23 ASSESSMENT — PAIN SCALES - GENERAL
PAINLEVEL_OUTOF10: 0

## 2018-10-23 ASSESSMENT — PAIN DESCRIPTION - LOCATION
LOCATION: GROIN

## 2018-10-23 ASSESSMENT — PAIN DESCRIPTION - PAIN TYPE
TYPE: SURGICAL PAIN

## 2018-10-23 NOTE — PROGRESS NOTES
1743 admitted to pacu stage 2 post op right groin area with band aide and is dry  No swelling at site right leg/ foot warm/ pink

## 2018-10-23 NOTE — PROGRESS NOTES
slow and guarded, Pt at times unsteady with trunk retroversion, prompt required at times for [employer] direction during turning. Pt fatigued after activity    Pt was left upright in a bedside chair with call light in reach. Chair/bed alarm: chair alarm active    Treatment time: 25 minutes  Time out: 1105    Pt is making good progress toward established Physical Therapy goals. Continue with physical therapy current plan of care.     Barbarann Merlin PTA   License Number: [id number]

## 2018-10-23 NOTE — PROGRESS NOTES
Occupational Therapy      Occupational Therapy attempt - patient lying in bed, daughter present in room.   States patient to have procedure soon - appreciative of stopping in, try back later

## 2018-10-23 NOTE — PROGRESS NOTES
benefit and philosophy was explained to family. The following levels of care were discussed including, routine level of care at private home or facility, which hospice is not responsible for any room and board fees, and GIP level of care at the [facility] for short term symptom management. Once symptoms become managed, the patient would need to be moved to a lower level of care. Hospice House transition program also explained. Family informed that with the routine level of care,  the hospice team consists of the RN who visits 1-3 times a week, a  who visits within the first five days of the hospice election, the personal care team who visit 1-3 times a week, non-medical volunteers and Chaplains. At this time family wants to continue with chemo and radiation. Family would like for patient to go home with home health care and then follow up with [facility] when all treatments are complete. Family will contact [facility] when ready to transition to hospice care. Family would like a follow up tomorrow for any questions. Charge nurse updated    Discharge Plan:  Discharge Disposition; [facility] Name: none    [facility] plan:  1. Hospice is not managing any patient care at this time  2. Will follow up with family tomorrow for any questions. Family will contact [facility] when ready to transition to hospice care  3.   Any questions, please call [phone]    Electronically signed by Alton Blake RN on 10/23/2018 at 4:00 PM

## 2018-10-23 NOTE — PROGRESS NOTES
Dano Bustamante    status reviewed   daughters  at bedside     SUBJECTIVE:  Admitted with Rt. Leg edema   Recent mild mental status changes   Feels better today   No pain , no N / V / D    OBJECTIVE:    BP (!) 155/83   Pulse 87   Temp 97.6 °F (36.4 °C) (Oral)   Resp 20   Ht 5' 7\" (1.702 m)   Wt 148 lb 3.2 oz (67.2 kg)   SpO2 98%   BMI 23.21 kg/m²   PHYSICAL:  GENERAL:  Alert, orient x 3, in no acute distress. HEENT:  No icterus  LYMPH:  No lymphadenopathy. HEART:  Regular rate and rhythm. No murmurs. LUNGS:  Clear to auscultation. ABDOMEN:  Soft. Non-tender. No mass / ascites  EXTREMITIES:  Warm,dry. Rt.leg  edema. NEURO:  No focal deficits.            CBC with Differential:    Lab Results   Component Value Date    WBC 14.1 10/21/2018    RBC 5.12 10/21/2018    HGB 13.7 10/21/2018    HCT 42.4 10/21/2018     10/21/2018    MCV 82.8 10/21/2018    MCH 26.8 10/21/2018    MCHC 32.3 10/21/2018    RDW 15.0 10/21/2018    LYMPHOPCT 9.2 10/18/2018    MONOPCT 11.1 10/18/2018    BASOPCT 0.7 10/18/2018    MONOSABS 1.19 10/18/2018    LYMPHSABS 0.99 10/18/2018    EOSABS 0.07 10/18/2018    BASOSABS 0.07 10/18/2018        CMP:    Lab Results   Component Value Date     10/21/2018    K 4.3 10/21/2018    K 5.3 10/18/2018     10/21/2018    CO2 21 10/21/2018    BUN 20 10/21/2018    CREATININE 0.7 10/21/2018    GFRAA >60 10/21/2018    LABGLOM >60 10/21/2018    GLUCOSE 89 10/21/2018    GLUCOSE 92 01/09/2012    PROT 5.8 10/18/2018    LABALBU 3.4 10/18/2018    LABALBU 4.6 01/09/2012    CALCIUM 7.9 10/21/2018    BILITOT 0.5 10/18/2018    ALKPHOS 104 10/18/2018    AST 32 10/18/2018    ALT 18 10/18/2018     LDH:  No results found for: LDH  PT/INR:    Lab Results   Component Value Date    PROTIME 13.2 10/18/2018    INR 1.1 10/18/2018     PTT:    Lab Results   Component Value Date    APTT 29.0 09/19/2018   [APTT  VITAMIN B12: No components found for: B12  FOLATE:  No results found for: FOLATE  IRON:  No results found

## 2018-10-23 NOTE — PROGRESS NOTES
Vascular Surgery Progress Note    CC: f/u DVT    HISTORY:  The patient is awake, alert, and oriented. Family present. IMPRESSION:  RLE DVT, metastatic prostate cancer. PLAN:  Insertion IVC filter. I reviewed the procedure with the patient. I discussed the risks, benefits, and alternatives of the procedure. The patient understands and consents. All questions were answered. 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    Sepsis due to urinary tract infection (HCC) A41.9, N39.0    Moderate protein-calorie malnutrition (HCC) E44.0    Brain metastases (HCC) C79.31    Acute deep vein thrombosis (DVT) of right lower extremity (HCC) I82.401    Malignant melanoma (HCC) C43.9    Bone metastasis (HCC) C79.51       Current Medications:    sodium chloride 75 mL/hr at 10/23/18 0842      gadoteridol, sodium chloride flush, magnesium hydroxide, ondansetron    clindamycin (CLEOCIN) IV  600 mg Intravenous Once    [START ON 10/24/2018] dexamethasone  4 mg Intravenous Q12H    enoxaparin  40 mg Subcutaneous Daily    levofloxacin  500 mg Oral Daily    levETIRAcetam  500 mg Oral BID    amLODIPine  5 mg Oral Daily    finasteride  5 mg Oral QPM    lisinopril  10 mg Oral BID    metoprolol tartrate  12.5 mg Oral BID    tamsulosin  0.4 mg Oral Daily    sodium chloride flush  10 mL Intravenous 2 times per day          PHYSICAL EXAM:    Vitals:    10/23/18 1620   BP: (!) 142/78   Pulse: 90   Resp: 18   Temp: 97.8 °F (36.6 °C)   SpO2: 96%     Pt AAOx3.       LABS:    Lab Results   Component Value Date    WBC 14.1 (H) 10/21/2018    HGB 13.7 10/21/2018    HCT 42.4 10/21/2018    PLT

## 2018-10-23 NOTE — PROGRESS NOTES
1810 report called to [facility] rn and called out to waiting room to have family return to room 532

## 2018-10-24 ENCOUNTER — HOSPITAL ENCOUNTER (OUTPATIENT)
Age: 78
Discharge: HOME | End: 2018-10-24
Payer: MEDICARE

## 2018-10-24 VITALS
HEART RATE: 81 BPM | RESPIRATION RATE: 16 BRPM | TEMPERATURE: 98.2 F | SYSTOLIC BLOOD PRESSURE: 110 MMHG | HEIGHT: 67 IN | DIASTOLIC BLOOD PRESSURE: 65 MMHG | OXYGEN SATURATION: 96 % | WEIGHT: 150.38 LBS | BODY MASS INDEX: 23.6 KG/M2

## 2018-10-24 PROCEDURE — 2580000003 HC RX 258: Performed by: FAMILY MEDICINE

## 2018-10-24 PROCEDURE — 6360000002 HC RX W HCPCS: Performed by: SURGERY

## 2018-10-24 PROCEDURE — 6370000000 HC RX 637 (ALT 250 FOR IP): Performed by: NURSE PRACTITIONER

## 2018-10-24 PROCEDURE — A0428 BLS: HCPCS

## 2018-10-24 PROCEDURE — 6360000002 HC RX W HCPCS: Performed by: INTERNAL MEDICINE

## 2018-10-24 PROCEDURE — 97110 THERAPEUTIC EXERCISES: CPT

## 2018-10-24 PROCEDURE — 77417 THER RADIOLOGY PORT IMAGE(S): CPT

## 2018-10-24 PROCEDURE — 97530 THERAPEUTIC ACTIVITIES: CPT

## 2018-10-24 PROCEDURE — 6370000000 HC RX 637 (ALT 250 FOR IP): Performed by: FAMILY MEDICINE

## 2018-10-24 PROCEDURE — 6370000000 HC RX 637 (ALT 250 FOR IP): Performed by: INTERNAL MEDICINE

## 2018-10-24 PROCEDURE — 77412 RADIATION TX DELIVERY LVL 3: CPT

## 2018-10-24 PROCEDURE — A0425 GROUND MILEAGE: HCPCS

## 2018-10-24 PROCEDURE — D0001ZZ BEAM RADIATION OF BRAIN USING PHOTONS 1 - 10 MEV: ICD-10-PCS | Performed by: RADIOLOGY

## 2018-10-24 PROCEDURE — DP0C2ZZ BEAM RADIATION OF OTHER BONE USING PHOTONS >10 MEV: ICD-10-PCS | Performed by: RADIOLOGY

## 2018-10-24 RX ADMIN — ENOXAPARIN SODIUM 40 MG: 40 INJECTION, SOLUTION INTRAVENOUS; SUBCUTANEOUS at 09:43

## 2018-10-24 RX ADMIN — DEXAMETHASONE SODIUM PHOSPHATE 4 MG: 4 INJECTION, SOLUTION INTRAMUSCULAR; INTRAVENOUS at 02:06

## 2018-10-24 RX ADMIN — METOPROLOL TARTRATE 12.5 MG: 25 TABLET ORAL at 09:40

## 2018-10-24 RX ADMIN — SODIUM CHLORIDE: 9 INJECTION, SOLUTION INTRAVENOUS at 02:06

## 2018-10-24 RX ADMIN — LEVOFLOXACIN 500 MG: 500 TABLET, FILM COATED ORAL at 09:39

## 2018-10-24 RX ADMIN — Medication 10 ML: at 09:45

## 2018-10-24 RX ADMIN — LISINOPRIL 10 MG: 10 TABLET ORAL at 09:40

## 2018-10-24 RX ADMIN — AMLODIPINE BESYLATE 5 MG: 5 TABLET ORAL at 09:39

## 2018-10-24 RX ADMIN — LEVETIRACETAM 500 MG: 500 TABLET ORAL at 09:39

## 2018-10-24 RX ADMIN — TAMSULOSIN HYDROCHLORIDE 0.4 MG: 0.4 CAPSULE ORAL at 09:45

## 2018-10-24 ASSESSMENT — PAIN SCALES - GENERAL: PAINLEVEL_OUTOF10: 0

## 2018-10-24 NOTE — PROGRESS NOTES
Physical Therapy    Pt NA this AM, off the floor at radiation. Will follow at another date/time.     Domitila Saunders PTA [id number]

## 2018-10-24 NOTE — PROGRESS NOTES
Physical Therapy    Facility/Department: [address] Intermediate  Treatment note    NAME: Nyla Pink  : 1940  MRN: [id number]    Date of Service: 10/24/2018    Patient Diagnosis(es): The encounter diagnosis was Acute deep vein thrombosis (DVT) of right lower extremity, unspecified vein ([facility] [address] has a past medical history of Brain metastases ([facility] [address] Hypertension; and Urinary retention. has a past surgical history that includes Appendectomy; Tonsillectomy; Colonoscopy; Vena Cava Filter Placement (10/23/2018); and pr ins intrvas vc filtr w/wo vas acs vsl selxn rs&i (N/A, 10/23/2018). Evaluating Therapist: Meg MORRIS      Room #: 193  DIAGNOSIS: Sepsis due to UTI  PRECAUTIONS: falls, alarm    Social:  Pt lives with daughters (daughters work during the day)  in a 2 floor plan. Prior to admission independent without device. Initial Evaluation  Date: 10/19 Treatment  10/24/18    Short Term/ Long Term   Goals   Was pt agreeable to Eval/treatment? yes yes    Does pt have pain? No c/o pain No complaints    Bed Mobility  Rolling: NT  Supine to sit: max assist of 2  Sit to supine: NT  Scooting: max assist of 2 Rolling: NT  Supine to sit: NT  Sit to supine: NT  Scooting: NT Min assist   Transfers Sit to stand: mod assist of 2  Stand to sit: mod assist of 2  Stand pivot: mod assist of 2 Sit to stand:  Mod A  Stand to sit: Mod A  Stand Pivot: NT Min assist   Ambulation    8 feet with no device with mod assist of 2 (hand held assist) 15 feet and 220 feet x 1 each using WW for support Min/Mod A for balance 50 feet with AAD min assist   Stair Negotiation  Ascended and descended  NT NT     AM-PAC Raw score               10/24 15/24      Balance: poor dynamic using WW for support  Therapeutic Exercise: B ankle pumps AROM; LAQ's A/AAROM x 20    Patient education  Pt was educated on UE usage with transfers, gait quality promoting posture    Patient response to education:   Pt verbalized understanding Pt demonstrated

## 2018-10-24 NOTE — PROGRESS NOTES
Subjective: The patient is resting comfortably. No problems overnight. Objective:    /70   Pulse 83   Temp 97.5 °F (36.4 °C) (Oral)   Resp 16   Ht 5' 7\" (1.702 m)   Wt 150 lb 6 oz (68.2 kg)   SpO2 97%   BMI 23.55 kg/m²     Heart:  RRR, no murmurs, gallops, or rubs.   Lungs:  CTA bilaterally, no wheeze, rales or rhonchi  Abd: bowel sounds present, nontender, nondistended, no masses  Extrem: Swelling RLE    Labs:  CBC:   Lab Results   Component Value Date    WBC 14.1 10/21/2018    RBC 5.12 10/21/2018    HGB 13.7 10/21/2018    HCT 42.4 10/21/2018    MCV 82.8 10/21/2018    MCH 26.8 10/21/2018    MCHC 32.3 10/21/2018    RDW 15.0 10/21/2018     10/21/2018    MPV 10.0 10/21/2018     CMP:    Lab Results   Component Value Date     10/21/2018    K 4.3 10/21/2018    K 5.3 10/18/2018     10/21/2018    CO2 21 10/21/2018    BUN 20 10/21/2018    CREATININE 0.7 10/21/2018    GFRAA >60 10/21/2018    LABGLOM >60 10/21/2018    GLUCOSE 89 10/21/2018    GLUCOSE 92 01/09/2012    PROT 5.8 10/18/2018    LABALBU 3.4 10/18/2018    LABALBU 4.6 01/09/2012    CALCIUM 7.9 10/21/2018    BILITOT 0.5 10/18/2018    ALKPHOS 104 10/18/2018    AST 32 10/18/2018    ALT 18 10/18/2018     PT/INR:    Lab Results   Component Value Date    PROTIME 13.2 10/18/2018    INR 1.1 10/18/2018          Current Facility-Administered Medications:     dexamethasone (DECADRON) injection 4 mg, 4 mg, Intravenous, Q12H, Sandra Gutierrez MD, 4 mg at 10/24/18 0206    enoxaparin (LOVENOX) injection 40 mg, 40 mg, Subcutaneous, Daily, Pete Avila MD, 40 mg at 10/22/18 1022    levofloxacin (LEVAQUIN) tablet 500 mg, 500 mg, Oral, Daily, April Rhona Barbour, APRN - CNP, 500 mg at 10/23/18 8162    levETIRAcetam (KEPPRA) tablet 500 mg, 500 mg, Oral, BID, Sandra Gutierrez MD, 500 mg at 10/23/18 2155    gadoteridol (PROHANCE) injection 14 mL, 14 mL, Intravenous, ONCE PRN, Jayme Steele MD    amLODIPine (NORVASC) tablet 5 mg, 5 mg, Oral, Daily, Merril Kida, DO, 5 mg at 10/23/18 9804    finasteride (PROSCAR) tablet 5 mg, 5 mg, Oral, QPM, Herb A Rich, DO, 5 mg at 10/23/18 2159    lisinopril (PRINIVIL;ZESTRIL) tablet 10 mg, 10 mg, Oral, BID, Merril Kida, DO, 10 mg at 10/23/18 2158    metoprolol tartrate (LOPRESSOR) tablet 12.5 mg, 12.5 mg, Oral, BID, Merril Kida, DO, 12.5 mg at 10/23/18 2159    tamsulosin (FLOMAX) capsule 0.4 mg, 0.4 mg, Oral, Daily, Amor Gralyseles A Rich, DO, 0.4 mg at 10/23/18 6888    sodium chloride flush 0.9 % injection 10 mL, 10 mL, Intravenous, 2 times per day, Amor Grumbles A Rich, DO, 10 mL at 10/21/18 0857    sodium chloride flush 0.9 % injection 10 mL, 10 mL, Intravenous, PRN, Merril Kida, DO    magnesium hydroxide (MILK OF MAGNESIA) 400 MG/5ML suspension 30 mL, 30 mL, Oral, Daily PRN, Merril Kida, DO    ondansetron (ZOFRAN) injection 4 mg, 4 mg, Intravenous, Q6H PRN, Merril Kida, DO    0.9 % sodium chloride infusion, , Intravenous, Continuous, Merril Kida, DO, Last Rate: 75 mL/hr at 10/24/18 0206    Assessment:  DVT   Metastatic DZ  See below    Patient Active Problem List   Diagnosis    HTN (hypertension)    Hyperlipidemia    Prostate cancer ([address]    Thrombocytopenia ([address]    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, extranodal and organ    Vitamin D deficiency    Murmur    Mitral regurgitation    Colon polyp    Marginal zone lymphoma ([address]    Sepsis due to urinary tract infection ([address]    Moderate protein-calorie malnutrition ([address]    Brain metastases ([address]    Acute deep vein thrombosis (DVT) of right lower extremity (HCC)    Malignant melanoma ([address]    Bone metastasis ([address]       Plan:  See orders  IVC Filter placed  D/C planning  Family considering Hospice        Ana Donahue  7:35 AM  10/24/2018

## 2018-10-24 NOTE — PROGRESS NOTES
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, extranodal and organ    Vitamin D deficiency    Murmur    Mitral regurgitation    Colon polyp    Marginal zone lymphoma ([facility] [address]    Sepsis due to urinary tract infection (HCC)    Moderate protein-calorie malnutrition ([facility] [address]    Brain metastases (HCC)    Acute deep vein thrombosis (DVT) of right lower extremity (HCC)    Malignant melanoma (HCC)    Bone metastasis (HCC)       Subjective:  Pt was agreeable to BUE only secondary to fatigue from PT session earlier  Precautions: Falls, bed/ chair alarm  Chart Reviewed:  Yes          Independent Supervision Contact Guard Assist Minimal Assist Moderate Assist Maximum Assist Dependent   Feeding          Grooming          UE  Bathing          LE Bathing          UE Dressing          LE  Dressing                    Toileting                   Comments:  NT due to pt only agreeable to BUE exercises. Functional Transfers:  NT due to fatigue. Therapeutic Exercises:  While sitting in arm chair pt performed BUE exercises consisting of DAVID shoulder flexion, shoulder ER, shoulder horizontal abd, and elbow flexion and extension (2x10 reps) using yellow theraband. Pt required max vc, tactile cues and demos to perform exercises secondary to cognitive status. Exercises were performed to improve BUE strength to increase independence with ADLs. Other:  Pt reported fatigue and displayed difficulty following instructions from therapist.  Pt daughter expressed that pt would benefit from [facility] but does not own AD. SW notified. Pt daughter and friends were present during session.     Education:  Importance of BUE exercises to increase

## 2018-10-25 ENCOUNTER — CARE COORDINATION (OUTPATIENT)
Dept: CASE MANAGEMENT | Age: 78
End: 2018-10-25

## 2018-10-25 DIAGNOSIS — C79.31 BRAIN METASTASES (HCC): Primary | ICD-10-CM

## 2018-10-25 DIAGNOSIS — C79.51 BONE METASTASIS (HCC): ICD-10-CM

## 2018-10-25 PROCEDURE — 77412 RADIATION TX DELIVERY LVL 3: CPT

## 2018-10-25 RX ORDER — CEFDINIR 300 MG/1
300 CAPSULE ORAL 2 TIMES DAILY
COMMUNITY
Start: 2018-10-24 | End: 2018-11-02

## 2018-10-25 NOTE — CARE COORDINATION
[facility] Transitions Initial Follow Up Call    Call within 2 business days of discharge: Yes    Patient: Yue Diego Patient : 1940   MRN: [id number]  Reason for Admission: There are no discharge diagnoses documented for the most recent discharge. Discharge Date: 10/24/18 RARS: Readmission Risk Score: 25     Spoke with: [address]  dtr on [facility] services provided:  Obtained and reviewed discharge summary and/or continuity of care documents  Assessment and support for treatment adherence and medication management-review of discharge instructions and medications and cg support    Care Transitions 24 Hour Call    Schedule Follow Up Appointment with PCP:  Completed  Do you have any ongoing symptoms?:  Yes  Interventions for patient-reported symptoms:  Notified Home Care  Do you have a copy of your discharge instructions?:  Yes  Do you have all of your prescriptions and are they filled?:  Yes  Have you scheduled your follow up appointment?:  Yes  How are you going to get to your appointment?:  Car - family or friend to transport  Were you discharged with any Home Care or Post Acute Services:  Yes  Post Acute Services:  Home Health  Do you feel like you have everything you need to keep you well at home?:  Yes  Care Transitions Interventions  No Identified Needs         Follow Up:  Initial CTC phone visit outreach to [address] [facility] @ home with his 2 daughters as main cgs. Daughter [address] is spokesperson today and on HIPPA form. Introduced self and role to her voiced understanding and she states her wish to continue. States her father is stable, weak, hospital bed but ambulates with w/w and will go to Radiation therapy by car for first visit today. He has a jalloh catheter that they manage and [facility] is coming for admit and assist today. Appetite is fair, pain is managed. Medication reconciliation today and [address] manifests knowledge of all meds and all in home.       [address] has daily radiation therapy scheduled for her dad with [facility]. She can follow with them with any concerns or problems that arise thruout therapy. Follow up with PCP, Dr Isabel Pinto is for Oct 29. Reiterated [facility], [facility]. is 24/7 on call additionally if any problems or concerns. She voices her understanding and is in agreement for CTC to finalize episode today.    Future Appointments  Date Time Provider Sherrell Escudero   10/29/2018 1:15 PM MD Randi Hinton [facility]   2/26/2019 8:30 AM [facility]       Joseline Calles RN

## 2018-10-26 ENCOUNTER — TELEPHONE (OUTPATIENT)
Dept: RADIATION ONCOLOGY | Age: 78
End: 2018-10-26

## 2018-10-26 DIAGNOSIS — R11.0 NAUSEA: ICD-10-CM

## 2018-10-26 DIAGNOSIS — C79.31 BRAIN METASTASIS (HCC): Primary | ICD-10-CM

## 2018-10-26 LAB
EKG ATRIAL RATE: 90 BPM
EKG P AXIS: 39 DEGREES
EKG P-R INTERVAL: 162 MS
EKG Q-T INTERVAL: 348 MS
EKG QRS DURATION: 88 MS
EKG QTC CALCULATION (BAZETT): 425 MS
EKG R AXIS: 8 DEGREES
EKG T AXIS: 67 DEGREES
EKG VENTRICULAR RATE: 90 BPM

## 2018-10-26 PROCEDURE — 77412 RADIATION TX DELIVERY LVL 3: CPT

## 2018-10-26 RX ORDER — DEXAMETHASONE 2 MG/1
2 TABLET ORAL 3 TIMES DAILY
Qty: 60 TABLET | Refills: 0 | Status: SHIPPED | OUTPATIENT
Start: 2018-10-26 | End: 2018-11-30

## 2018-10-26 RX ORDER — PANTOPRAZOLE SODIUM 20 MG/1
20 TABLET, DELAYED RELEASE ORAL DAILY
Qty: 30 TABLET | Refills: 3 | Status: SHIPPED | OUTPATIENT
Start: 2018-10-26

## 2018-10-26 RX ORDER — ONDANSETRON 4 MG/1
4 TABLET, ORALLY DISINTEGRATING ORAL EVERY 8 HOURS PRN
Qty: 90 TABLET | Refills: 2 | Status: SHIPPED | OUTPATIENT
Start: 2018-10-26

## 2018-10-29 ENCOUNTER — TELEPHONE (OUTPATIENT)
Dept: FAMILY MEDICINE CLINIC | Age: 78
End: 2018-10-29

## 2018-10-29 DIAGNOSIS — C85.90 STAGE 4 LOW GRADE LYMPHOMA (HCC): Primary | ICD-10-CM

## 2018-10-29 PROCEDURE — 77412 RADIATION TX DELIVERY LVL 3: CPT

## 2018-10-29 NOTE — TELEPHONE ENCOUNTER
Spoke with the pt daughter. Bedside commode was faced to [address] [place] [address] [place] is waiting up front for pickup. They need to watch the diet to avoid brain swelling and can use all the other spices, just not salt. Keep the appt with Dr Neville Bermudez and voice her concerns with him to avoid any problem. Daughter ok and expressed understanding. She asked again if he should be on a water pill. She will cancel his appt if she is seen by a NP in the home.

## 2018-10-29 NOTE — TELEPHONE ENCOUNTER
Georgette Lundborg from [facility] called about the pt. They need a order for a wheelchair sent to [facility] and a order for palliative care. LVM for Georgette Lundborg asking where the order for  Palliative was to be sent.

## 2018-10-29 NOTE — PROGRESS NOTES
DEPARTMENT OF RADIATION ONCOLOGY        ON TREATMENT VISIT       10/26/2018      NAME:  Gabriel Almendarez    YOB: 1940    Diagnosis: Malignant melanoma with metastases to lung, liver, bone, adrenal and brain. History of NHL. History of prostate cancer. SUBJECTIVE:   Klever Goodson has now received 1200 cGy/ 3 fractions directed to the whole brain and also the same dose to the right iliac sacral mass. Patient seen due to complaints of nausea. Present in exam room patient, daughter Veda Max and [place] city and caretaker. Patient reports nausea began about one day ago. Denies emesis. Not taking and antiemetic medications per patient/ family report, review of electronic medication list and review of Primary oncologist office visit note scanned under media tab. Patient's Primary Oncologist Dr. Eber Chavez. Past medical, surgical, social and family histories reviewed and updated as indicated.     Pain: controlled     ALLERGIES:  Rocephin [ceftriaxone]       Current Outpatient Prescriptions   Medication Sig Dispense Refill    ondansetron (ZOFRAN ODT) 4 MG disintegrating tablet Place 1 tablet under the tongue every 8 hours as needed for Nausea or Vomiting 90 tablet 2    cefdinir (OMNICEF) 300 MG capsule Take 300 mg by mouth 2 times daily      levofloxacin (LEVAQUIN) 500 MG tablet Take 1 tablet by mouth daily for 10 days 10 tablet 0    CITRUS CALCIUM +D 315-250 MG-UNIT TABS per tablet Take 1 tablet by mouth 2 times daily  3    dutasteride (AVODART) 0.5 MG capsule Take 0.5 mg by mouth every evening       tamsulosin (FLOMAX) 0.4 MG capsule Take 1 capsule by mouth daily for 12 days (Patient taking differently: Take 0.4 mg by mouth 2 times daily ) 12 capsule 0    amLODIPine (NORVASC) 5 MG tablet TAKE 1 TABLET DAILY (Patient taking differently: Take 5 mg by mouth every morning ) 90 tablet 3    lisinopril (PRINIVIL;ZESTRIL) 20 MG tablet TAKE ONE-HALF (1/2)
Take 0.4 mg by mouth 2 times daily ) 12 capsule 0    amLODIPine (NORVASC) 5 MG tablet TAKE 1 TABLET DAILY (Patient taking differently: Take 5 mg by mouth every morning ) 90 tablet 3    lisinopril (PRINIVIL;ZESTRIL) 20 MG tablet TAKE ONE-HALF (1/2) TABLET TWICE A DAY (Patient taking differently: Take 10 mg by mouth 2 times daily ) 90 tablet 3    metoprolol tartrate (LOPRESSOR) 25 MG tablet TAKE ONE-HALF (1/2) TABLET TWICE A DAY (Patient taking differently: Take 12.5 mg by mouth 2 times daily ) 90 tablet 3     No current facility-administered medications for this encounter. OBJECTIVE:     Wt Readings from Last 3 Encounters:   10/24/18 150 lb 6 oz (68.2 kg)   09/14/18 164 lb (74.4 kg)   08/27/18 146 lb (66.2 kg)       Alert, oriented and sitting upright in wheelchair. Pleasant and conversant. ASSESSMENT/PLAN:     Patient is tolerating treatments with expected toxicities. Nausea: resolved. Questions answered to apparent satisfaction. Current and planned dose reviewed. Last treatment scheduled tomorrow. 2 week follow-up with NP (plan to start steroid tapering at that time). Debby Macias (patient's daughter) requests patient appointment be scheduled in morning hours.  Caroline Asif updated - to schedule appointment.      Ebony Soares, MSN, RN, APRN-CNP  Certified Nurse Practitioner for [address] [place] [address]   P: [phone]/ F: [phone]

## 2018-10-30 ENCOUNTER — TELEPHONE (OUTPATIENT)
Dept: FAMILY MEDICINE CLINIC | Age: 78
End: 2018-10-30

## 2018-10-30 PROCEDURE — 77412 RADIATION TX DELIVERY LVL 3: CPT

## 2018-10-30 PROCEDURE — 77336 RADIATION PHYSICS CONSULT: CPT

## 2018-10-30 NOTE — TELEPHONE ENCOUNTER
Macario thomas from [facility] called.  They are going to see the pt 2X a week X4wks, pending insurance approval.

## 2018-11-02 ENCOUNTER — TELEPHONE (OUTPATIENT)
Dept: FAMILY MEDICINE CLINIC | Age: 78
End: 2018-11-02

## 2018-11-06 NOTE — TELEPHONE ENCOUNTER
Daughter called stated she spoke with GI on Friday , they gave him lactulose , and patient began passing stool everyday since, has not had a bowel movement as of yet today but if he dont pass stool today, they are to give durolax and miraxlax , but daughter concern today he started a low grade fever 100.4, patient has no other symptoms

## 2018-11-07 ENCOUNTER — TELEPHONE (OUTPATIENT)
Dept: FAMILY MEDICINE CLINIC | Age: 78
End: 2018-11-07

## 2018-11-09 ENCOUNTER — HOSPITAL ENCOUNTER (EMERGENCY)
Age: 78
Discharge: HOME | End: 2018-11-09
Attending: EMERGENCY MEDICINE
Payer: MEDICARE

## 2018-11-09 VITALS
HEIGHT: 67 IN | DIASTOLIC BLOOD PRESSURE: 60 MMHG | BODY MASS INDEX: 21.82 KG/M2 | RESPIRATION RATE: 20 BRPM | WEIGHT: 139 LBS | OXYGEN SATURATION: 98 % | SYSTOLIC BLOOD PRESSURE: 128 MMHG | HEART RATE: 82 BPM | TEMPERATURE: 98.7 F

## 2018-11-09 DIAGNOSIS — N39.0 URINARY TRACT INFECTION WITHOUT HEMATURIA, SITE UNSPECIFIED: Primary | ICD-10-CM

## 2018-11-09 LAB
BACTERIA: ABNORMAL /HPF
BILIRUBIN URINE: NEGATIVE
BLOOD, URINE: ABNORMAL
CLARITY: CLEAR
COLOR: YELLOW
EPITHELIAL CELLS, UA: ABNORMAL /HPF
GLUCOSE URINE: NEGATIVE MG/DL
KETONES, URINE: NEGATIVE MG/DL
LEUKOCYTE ESTERASE, URINE: ABNORMAL
NITRITE, URINE: NEGATIVE
PH UA: 5.5 (ref 5–9)
PROTEIN UA: 100 MG/DL
RBC UA: ABNORMAL /HPF (ref 0–2)
SPECIFIC GRAVITY UA: 1.02 (ref 1–1.03)
UROBILINOGEN, URINE: 0.2 E.U./DL
WBC UA: ABNORMAL /HPF (ref 0–5)

## 2018-11-09 PROCEDURE — 6370000000 HC RX 637 (ALT 250 FOR IP)

## 2018-11-09 PROCEDURE — 99284 EMERGENCY DEPT VISIT MOD MDM: CPT

## 2018-11-09 PROCEDURE — 87077 CULTURE AEROBIC IDENTIFY: CPT

## 2018-11-09 PROCEDURE — 81001 URINALYSIS AUTO W/SCOPE: CPT

## 2018-11-09 PROCEDURE — 87186 SC STD MICRODIL/AGAR DIL: CPT

## 2018-11-09 PROCEDURE — 87088 URINE BACTERIA CULTURE: CPT

## 2018-11-09 RX ORDER — CEFDINIR 300 MG/1
300 CAPSULE ORAL EVERY 12 HOURS SCHEDULED
Status: DISCONTINUED | OUTPATIENT
Start: 2018-11-09 | End: 2018-11-09 | Stop reason: HOSPADM

## 2018-11-09 RX ORDER — CEFDINIR 300 MG/1
CAPSULE ORAL
Status: COMPLETED
Start: 2018-11-09 | End: 2018-11-09

## 2018-11-09 RX ORDER — CEFDINIR 300 MG/1
300 CAPSULE ORAL 2 TIMES DAILY
Qty: 20 CAPSULE | Refills: 0 | Status: SHIPPED | OUTPATIENT
Start: 2018-11-09 | End: 2018-11-13 | Stop reason: ALTCHOICE

## 2018-11-09 RX ADMIN — CEFDINIR 300 MG: 300 CAPSULE ORAL at 17:38

## 2018-11-12 ENCOUNTER — TELEPHONE (OUTPATIENT)
Dept: FAMILY MEDICINE CLINIC | Age: 78
End: 2018-11-12

## 2018-11-12 ENCOUNTER — CARE COORDINATION (OUTPATIENT)
Dept: CARE COORDINATION | Age: 78
End: 2018-11-12

## 2018-11-12 LAB
ORGANISM: ABNORMAL
URINE CULTURE, ROUTINE: ABNORMAL
URINE CULTURE, ROUTINE: ABNORMAL

## 2018-11-12 NOTE — CARE COORDINATION
Ambulatory Care Coordination ED Follow up Call       Reason for ED Visit:  Urinary tract infection  Care Management Risk Score: CMRS 8    Left voice message for patient regarding ED follow up call. Requested patient please return call;  CCSS contact information provided.

## 2018-11-13 ENCOUNTER — HOSPITAL ENCOUNTER (OUTPATIENT)
Dept: RADIATION ONCOLOGY | Age: 78
Discharge: HOME | End: 2018-11-13
Payer: MEDICARE

## 2018-11-13 VITALS — HEART RATE: 93 BPM | TEMPERATURE: 98.4 F | DIASTOLIC BLOOD PRESSURE: 62 MMHG | SYSTOLIC BLOOD PRESSURE: 80 MMHG

## 2018-11-13 DIAGNOSIS — C79.31 BRAIN METASTASIS (HCC): Primary | ICD-10-CM

## 2018-11-13 DIAGNOSIS — C79.51 BONE METASTASIS (HCC): ICD-10-CM

## 2018-11-13 PROCEDURE — 99999 PR OFFICE/OUTPT VISIT,PROCEDURE ONLY: CPT | Performed by: NURSE PRACTITIONER

## 2018-11-13 NOTE — PROGRESS NOTES
apply route Patient cannot walk 200 ft without stopping to rest.    Expiration 2 years 1 each 0    ondansetron (ZOFRAN ODT) 4 MG disintegrating tablet Place 1 tablet under the tongue every 8 hours as needed for Nausea or Vomiting 90 tablet 2    pantoprazole (PROTONIX) 20 MG tablet Take 1 tablet by mouth daily 30 tablet 3    dexamethasone (DECADRON) 2 MG tablet Take 1 tablet by mouth three times daily (Patient taking differently: Take 2 mg by mouth 2 times daily ) 60 tablet 0    CITRUS CALCIUM +D 315-250 MG-UNIT TABS per tablet Take 1 tablet by mouth 2 times daily  3    metoprolol tartrate (LOPRESSOR) 25 MG tablet TAKE ONE-HALF (1/2) TABLET TWICE A DAY (Patient taking differently: Take 12.5 mg by mouth 2 times daily ) 90 tablet 3    dutasteride (AVODART) 0.5 MG capsule Take 0.5 mg by mouth every evening       tamsulosin (FLOMAX) 0.4 MG capsule Take 1 capsule by mouth daily for 12 days 12 capsule 0     No current facility-administered medications for this encounter. Physical Examination:   Vitals:    11/13/18 1017   BP: (!) 88/50   Pulse: 93   Temp: 98.4 °F (36.9 °C)       Wt Readings from Last 3 Encounters:   11/09/18 139 lb (63 kg)   10/24/18 150 lb 6 oz (68.2 kg)   09/14/18 164 lb (74.4 kg)       Alert and orient at today's visit. Pleasant and conversant. Lung sound clear to auscultation bilaterally. Heart normal rate, regular rhythm with G 1/6 SM. No rub. ASSESSMENT/PLAN:     Palliative radiation therapy to whole brain and to iliac/ sacral Mets completed 10/30/2018. Dexamethasone to continue under direction of patient's primary oncologist Dr. Katie Lambert    Will send electronic copy of today's note which includes B/p reading to PCP, Dr. Harley Le for review. Radiation Oncology follow-up 3 months with Dr. Domi Barnett. Marc  is to follow up with other physicians/ APPs involved in his care as directed (including Med-Onc., Palliative Care and PCP).        The patient was given our

## 2018-11-14 ENCOUNTER — TELEPHONE (OUTPATIENT)
Dept: FAMILY MEDICINE CLINIC | Age: 78
End: 2018-11-14

## 2018-11-14 RX ORDER — SULFAMETHOXAZOLE AND TRIMETHOPRIM 800; 160 MG/1; MG/1
TABLET ORAL
Refills: 0 | COMMUNITY
Start: 2018-11-10 | End: 2018-12-20 | Stop reason: ALTCHOICE

## 2018-11-15 ENCOUNTER — TELEPHONE (OUTPATIENT)
Dept: FAMILY MEDICINE CLINIC | Age: 78
End: 2018-11-15

## 2018-11-15 ENCOUNTER — HOSPITAL ENCOUNTER (OUTPATIENT)
Age: 78
Discharge: HOME | End: 2018-11-17
Payer: MEDICARE

## 2018-11-15 LAB
ALBUMIN SERPL-MCNC: 3.1 G/DL (ref 3.5–5.2)
ALP BLD-CCNC: 68 U/L (ref 40–129)
ALT SERPL-CCNC: 22 U/L (ref 0–40)
ANION GAP SERPL CALCULATED.3IONS-SCNC: 16 MMOL/L (ref 7–16)
AST SERPL-CCNC: 20 U/L (ref 0–39)
BASOPHILS ABSOLUTE: 0.01 E9/L (ref 0–0.2)
BASOPHILS RELATIVE PERCENT: 0.1 % (ref 0–2)
BILIRUB SERPL-MCNC: 0.7 MG/DL (ref 0–1.2)
BUN BLDV-MCNC: 27 MG/DL (ref 8–23)
C-REACTIVE PROTEIN: 0.2 MG/DL (ref 0–0.4)
CALCIUM SERPL-MCNC: 7.9 MG/DL (ref 8.6–10.2)
CHLORIDE BLD-SCNC: 98 MMOL/L (ref 98–107)
CO2: 17 MMOL/L (ref 22–29)
CREAT SERPL-MCNC: 0.8 MG/DL (ref 0.7–1.2)
EOSINOPHILS ABSOLUTE: 0 E9/L (ref 0.05–0.5)
EOSINOPHILS RELATIVE PERCENT: 0 % (ref 0–6)
GFR AFRICAN AMERICAN: >60
GFR NON-AFRICAN AMERICAN: >60 ML/MIN/1.73
GLUCOSE BLD-MCNC: 78 MG/DL (ref 74–99)
HCT VFR BLD CALC: 32.5 % (ref 37–54)
HEMOGLOBIN: 10.8 G/DL (ref 12.5–16.5)
IMMATURE GRANULOCYTES #: 0.17 E9/L
IMMATURE GRANULOCYTES %: 1.7 % (ref 0–5)
LYMPHOCYTES ABSOLUTE: 0.49 E9/L (ref 1.5–4)
LYMPHOCYTES RELATIVE PERCENT: 5 % (ref 20–42)
MCH RBC QN AUTO: 26.9 PG (ref 26–35)
MCHC RBC AUTO-ENTMCNC: 33.2 % (ref 32–34.5)
MCV RBC AUTO: 81 FL (ref 80–99.9)
MONOCYTES ABSOLUTE: 0.68 E9/L (ref 0.1–0.95)
MONOCYTES RELATIVE PERCENT: 6.9 % (ref 2–12)
NEUTROPHILS ABSOLUTE: 8.51 E9/L (ref 1.8–7.3)
NEUTROPHILS RELATIVE PERCENT: 86.3 % (ref 43–80)
OVALOCYTES: ABNORMAL
PDW BLD-RTO: 17.8 FL (ref 11.5–15)
PLATELET # BLD: 98 E9/L (ref 130–450)
PLATELET CONFIRMATION: NORMAL
PMV BLD AUTO: 10.4 FL (ref 7–12)
POIKILOCYTES: ABNORMAL
POTASSIUM SERPL-SCNC: 4.4 MMOL/L (ref 3.5–5)
RBC # BLD: 4.01 E12/L (ref 3.8–5.8)
SEDIMENTATION RATE, ERYTHROCYTE: 1 MM/HR (ref 0–15)
SODIUM BLD-SCNC: 131 MMOL/L (ref 132–146)
TOTAL PROTEIN: 4.8 G/DL (ref 6.4–8.3)
WBC # BLD: 9.9 E9/L (ref 4.5–11.5)

## 2018-11-15 PROCEDURE — 85651 RBC SED RATE NONAUTOMATED: CPT

## 2018-11-15 PROCEDURE — 86140 C-REACTIVE PROTEIN: CPT

## 2018-11-15 PROCEDURE — 85025 COMPLETE CBC W/AUTO DIFF WBC: CPT

## 2018-11-15 PROCEDURE — 80053 COMPREHEN METABOLIC PANEL: CPT

## 2018-11-20 ENCOUNTER — TELEPHONE (OUTPATIENT)
Dept: FAMILY MEDICINE CLINIC | Age: 78
End: 2018-11-20

## 2018-11-21 ENCOUNTER — TELEPHONE (OUTPATIENT)
Dept: FAMILY MEDICINE CLINIC | Age: 78
End: 2018-11-21

## 2018-11-21 NOTE — TELEPHONE ENCOUNTER
Oumar Best from [facility] (formerly [facility]3] is requesting a new referral for [facility] urology, pt is not satisfied with Dr Minnie Montelongo.

## 2018-11-26 DIAGNOSIS — D64.9 ANEMIA, UNSPECIFIED TYPE: ICD-10-CM

## 2018-11-26 DIAGNOSIS — D69.6 THROMBOCYTOPENIA (HCC): ICD-10-CM

## 2018-11-26 DIAGNOSIS — C61 PROSTATE CANCER (HCC): ICD-10-CM

## 2018-11-26 DIAGNOSIS — E87.1 HYPONATREMIA: Primary | ICD-10-CM

## 2018-11-30 ENCOUNTER — TELEPHONE (OUTPATIENT)
Dept: FAMILY MEDICINE CLINIC | Age: 78
End: 2018-11-30

## 2018-12-03 NOTE — TELEPHONE ENCOUNTER
Please let pt know and ask him what he'd like to do. Thanks.      Electronically signed by Gopal Stewart MD on 12/2/2018 at 7:46 PM

## 2018-12-05 RX ORDER — FUROSEMIDE 40 MG/1
40 TABLET ORAL DAILY
Qty: 5 TABLET | Refills: 0 | Status: SHIPPED | OUTPATIENT
Start: 2018-12-05 | End: 2018-12-16 | Stop reason: SDUPTHER

## 2018-12-05 RX ORDER — POTASSIUM CHLORIDE 750 MG/1
10 CAPSULE, EXTENDED RELEASE ORAL DAILY
Qty: 5 CAPSULE | Refills: 0 | Status: SHIPPED | OUTPATIENT
Start: 2018-12-05

## 2018-12-14 ENCOUNTER — TELEPHONE (OUTPATIENT)
Dept: FAMILY MEDICINE CLINIC | Age: 78
End: 2018-12-14

## 2018-12-14 ENCOUNTER — TELEPHONE (OUTPATIENT)
Dept: PALLATIVE CARE | Age: 78
End: 2018-12-14

## 2018-12-15 NOTE — TELEPHONE ENCOUNTER
Can hold off for now. If gains 5 lbs in 3 days or gets SOB should call back. Thanks.      Electronically signed by Gustavo Lvein MD on 12/14/2018 at 7:59 PM

## 2018-12-16 RX ORDER — FUROSEMIDE 40 MG/1
40 TABLET ORAL DAILY
Qty: 5 TABLET | Refills: 0 | Status: SHIPPED | OUTPATIENT
Start: 2018-12-16 | End: 2018-12-20 | Stop reason: ALTCHOICE

## 2018-12-19 NOTE — TELEPHONE ENCOUNTER
Patient daughter called stated palliative care prescribed 5 days of lasix on Sunday due to swelling in his feet with pain, and having SOB , PCP notified

## 2018-12-20 ENCOUNTER — HOSPITAL ENCOUNTER (EMERGENCY)
Age: 78
Discharge: HOME | End: 2018-12-20
Attending: EMERGENCY MEDICINE
Payer: MEDICARE

## 2018-12-20 VITALS
BODY MASS INDEX: 21.05 KG/M2 | SYSTOLIC BLOOD PRESSURE: 118 MMHG | RESPIRATION RATE: 16 BRPM | TEMPERATURE: 98.5 F | HEIGHT: 66 IN | DIASTOLIC BLOOD PRESSURE: 80 MMHG | OXYGEN SATURATION: 98 % | HEART RATE: 92 BPM | WEIGHT: 131 LBS

## 2018-12-20 DIAGNOSIS — E86.0 DEHYDRATION: ICD-10-CM

## 2018-12-20 DIAGNOSIS — N39.0 URINARY TRACT INFECTION ASSOCIATED WITH INDWELLING URETHRAL CATHETER, SUBSEQUENT ENCOUNTER: Primary | ICD-10-CM

## 2018-12-20 DIAGNOSIS — T83.511D URINARY TRACT INFECTION ASSOCIATED WITH INDWELLING URETHRAL CATHETER, SUBSEQUENT ENCOUNTER: Primary | ICD-10-CM

## 2018-12-20 LAB
ALBUMIN SERPL-MCNC: 3.1 G/DL (ref 3.5–5.2)
ALP BLD-CCNC: 76 U/L (ref 40–129)
ALT SERPL-CCNC: 9 U/L (ref 0–40)
ANION GAP SERPL CALCULATED.3IONS-SCNC: 14 MMOL/L (ref 7–16)
AST SERPL-CCNC: 17 U/L (ref 0–39)
BACTERIA: ABNORMAL /HPF
BILIRUB SERPL-MCNC: 0.8 MG/DL (ref 0–1.2)
BILIRUBIN URINE: NEGATIVE
BLOOD, URINE: ABNORMAL
BUN BLDV-MCNC: 26 MG/DL (ref 8–23)
CALCIUM SERPL-MCNC: 8.3 MG/DL (ref 8.6–10.2)
CHLORIDE BLD-SCNC: 102 MMOL/L (ref 98–107)
CLARITY: ABNORMAL
CO2: 23 MMOL/L (ref 22–29)
COLOR: ABNORMAL
CREAT SERPL-MCNC: 0.7 MG/DL (ref 0.7–1.2)
EPITHELIAL CELLS, UA: ABNORMAL /HPF
GFR AFRICAN AMERICAN: >60
GFR NON-AFRICAN AMERICAN: >60 ML/MIN/1.73
GLUCOSE BLD-MCNC: 120 MG/DL (ref 74–99)
GLUCOSE URINE: NEGATIVE MG/DL
KETONES, URINE: NEGATIVE MG/DL
LEUKOCYTE ESTERASE, URINE: ABNORMAL
NITRITE, URINE: POSITIVE
PH UA: 5.5 (ref 5–9)
POTASSIUM SERPL-SCNC: 3.5 MMOL/L (ref 3.5–5)
PROTEIN UA: ABNORMAL MG/DL
RBC UA: ABNORMAL /HPF (ref 0–2)
SODIUM BLD-SCNC: 139 MMOL/L (ref 132–146)
SPECIFIC GRAVITY UA: 1.02 (ref 1–1.03)
TOTAL PROTEIN: 5 G/DL (ref 6.4–8.3)
UROBILINOGEN, URINE: 0.2 E.U./DL
WBC UA: >20 /HPF (ref 0–5)

## 2018-12-20 PROCEDURE — 99283 EMERGENCY DEPT VISIT LOW MDM: CPT

## 2018-12-20 PROCEDURE — 85025 COMPLETE CBC W/AUTO DIFF WBC: CPT

## 2018-12-20 PROCEDURE — 36415 COLL VENOUS BLD VENIPUNCTURE: CPT

## 2018-12-20 PROCEDURE — 81001 URINALYSIS AUTO W/SCOPE: CPT

## 2018-12-20 PROCEDURE — 87088 URINE BACTERIA CULTURE: CPT

## 2018-12-20 PROCEDURE — 2580000003 HC RX 258: Performed by: EMERGENCY MEDICINE

## 2018-12-20 PROCEDURE — 80053 COMPREHEN METABOLIC PANEL: CPT

## 2018-12-20 PROCEDURE — 87077 CULTURE AEROBIC IDENTIFY: CPT

## 2018-12-20 PROCEDURE — 6370000000 HC RX 637 (ALT 250 FOR IP): Performed by: EMERGENCY MEDICINE

## 2018-12-20 PROCEDURE — 87186 SC STD MICRODIL/AGAR DIL: CPT

## 2018-12-20 RX ORDER — SODIUM CHLORIDE 0.9 % (FLUSH) 0.9 %
10 SYRINGE (ML) INJECTION PRN
Status: DISCONTINUED | OUTPATIENT
Start: 2018-12-20 | End: 2018-12-21 | Stop reason: HOSPADM

## 2018-12-20 RX ORDER — SULFAMETHOXAZOLE AND TRIMETHOPRIM 800; 160 MG/1; MG/1
1 TABLET ORAL ONCE
Status: COMPLETED | OUTPATIENT
Start: 2018-12-20 | End: 2018-12-20

## 2018-12-20 RX ORDER — PREDNISONE 1 MG/1
5 TABLET ORAL DAILY
COMMUNITY
End: 2018-12-24 | Stop reason: ALTCHOICE

## 2018-12-20 RX ORDER — SULFAMETHOXAZOLE AND TRIMETHOPRIM 800; 160 MG/1; MG/1
1 TABLET ORAL 2 TIMES DAILY
Qty: 20 TABLET | Refills: 0 | Status: SHIPPED | OUTPATIENT
Start: 2018-12-20 | End: 2018-12-30

## 2018-12-20 RX ORDER — 0.9 % SODIUM CHLORIDE 0.9 %
1000 INTRAVENOUS SOLUTION INTRAVENOUS ONCE
Status: COMPLETED | OUTPATIENT
Start: 2018-12-20 | End: 2018-12-20

## 2018-12-20 RX ADMIN — SULFAMETHOXAZOLE AND TRIMETHOPRIM 1 TABLET: 800; 160 TABLET ORAL at 22:13

## 2018-12-20 RX ADMIN — SODIUM CHLORIDE 1000 ML: 9 INJECTION, SOLUTION INTRAVENOUS at 21:07

## 2018-12-20 ASSESSMENT — ENCOUNTER SYMPTOMS
DIARRHEA: 0
WHEEZING: 0
BACK PAIN: 0
CONSTIPATION: 0
BLOOD IN STOOL: 0
SHORTNESS OF BREATH: 0
ABDOMINAL PAIN: 0
VOMITING: 0
NAUSEA: 0
COUGH: 0

## 2018-12-21 LAB
ANISOCYTOSIS: ABNORMAL
BASOPHILS ABSOLUTE: 0.02 E9/L (ref 0–0.2)
BASOPHILS RELATIVE PERCENT: 0.2 % (ref 0–2)
EOSINOPHILS ABSOLUTE: 0 E9/L (ref 0.05–0.5)
EOSINOPHILS RELATIVE PERCENT: 0 % (ref 0–6)
HCT VFR BLD CALC: 29.6 % (ref 37–54)
HEMOGLOBIN: 9.6 G/DL (ref 12.5–16.5)
HYPOCHROMIA: ABNORMAL
IMMATURE GRANULOCYTES #: 0.34 E9/L
IMMATURE GRANULOCYTES %: 4 % (ref 0–5)
LYMPHOCYTES ABSOLUTE: 0.59 E9/L (ref 1.5–4)
LYMPHOCYTES RELATIVE PERCENT: 7 % (ref 20–42)
MCH RBC QN AUTO: 27.7 PG (ref 26–35)
MCHC RBC AUTO-ENTMCNC: 32.4 % (ref 32–34.5)
MCV RBC AUTO: 85.3 FL (ref 80–99.9)
MONOCYTES ABSOLUTE: 0.64 E9/L (ref 0.1–0.95)
MONOCYTES RELATIVE PERCENT: 7.6 % (ref 2–12)
NEUTROPHILS ABSOLUTE: 6.85 E9/L (ref 1.8–7.3)
NEUTROPHILS RELATIVE PERCENT: 81.2 % (ref 43–80)
PDW BLD-RTO: 18.4 FL (ref 11.5–15)
PLATELET # BLD: 177 E9/L (ref 130–450)
PMV BLD AUTO: 9.7 FL (ref 7–12)
POLYCHROMASIA: ABNORMAL
RBC # BLD: 3.47 E12/L (ref 3.8–5.8)
TEAR DROP CELLS: ABNORMAL
WBC # BLD: 8.4 E9/L (ref 4.5–11.5)

## 2018-12-21 NOTE — ED PROVIDER NOTES
Neurological: He is alert and oriented to person, place, and time. No cranial nerve deficit. Coordination normal.   Skin: Skin is warm and dry. Capillary refill takes less than 2 seconds. Nursing note and vitals reviewed. Procedures  --------------------------------------------- PAST HISTORY ---------------------------------------------  Past Medical History:  has a past medical history of Brain metastases ([address] Hypertension; and Urinary retention. Past Surgical History:  has a past surgical history that includes Appendectomy; Tonsillectomy; Colonoscopy; Vena Cava Filter Placement (10/23/2018); and pr ins intrvas vc filtr w/wo vas acs vsl selxn rs&i (N/A, 10/23/2018). Social History:  reports that he has never smoked. He has never used smokeless tobacco. He reports that he does not drink alcohol or use drugs. Family History: family history is not on file. The patients home medications have been reviewed.     Allergies: Rocephin [ceftriaxone]    -------------------------------------------------- RESULTS -------------------------------------------------  Labs:  Results for orders placed or performed during the hospital encounter of 12/20/18   CBC auto differential   Result Value Ref Range    WBC 8.4 4.5 - 11.5 E9/L    RBC 3.47 (L) 3.80 - 5.80 E12/L    Hemoglobin 9.6 (L) 12.5 - 16.5 g/dL    Hematocrit 29.6 (L) 37.0 - 54.0 %    MCV 85.3 80.0 - 99.9 fL    MCH 27.7 26.0 - 35.0 pg    MCHC 32.4 32.0 - 34.5 %    RDW 18.4 (H) 11.5 - 15.0 fL    Platelets 173 125 - 538 E9/L    MPV 9.7 7.0 - 12.0 fL   Comprehensive Metabolic Panel   Result Value Ref Range    Sodium 139 132 - 146 mmol/L    Potassium 3.5 3.5 - 5.0 mmol/L    Chloride 102 98 - 107 mmol/L    CO2 23 22 - 29 mmol/L    Anion Gap 14 7 - 16 mmol/L    Glucose 120 (H) 74 - 99 mg/dL    BUN 26 (H) 8 - 23 mg/dL    CREATININE 0.7 0.7 - 1.2 mg/dL    GFR Non-African American >60 >=60 mL/min/1.73    GFR African American >60     Calcium 8.3 (L) 8.6 - 10.2 Bactrim here. Most recent urine culture from one month ago showed pseudomonas species sensitive to Bactrim, resistant to Levaquin. Patient cannot take cephalosporins as he has an allergy. Patient was paced on Bactrim in the past and had no adverse reaction. Patient was given IV fluids here. Patient's kidney function was within normal limits. Patient was discharged with a prescription for Bactrim twice a day for 10 days and instructed to follow with urology and primary care. Patient felt much better after IV fluids. The patient is nontoxic appearing and stable for outpatient treatment and management. They were instructed to follow-up with their primary care physician and were given warning signs to return to the ED. All of their questions were answered at this time and are agreeable with discharge and early follow up.   [BM]      ED Course User Index  [BM] DO Gina [place]  Resident  12/20/18 1245

## 2018-12-21 NOTE — ED TRIAGE NOTES
Daughters speaking with Daksha starks NP. Ezequiel Mcnally saw him today and requesting labs as pt more confused over past few days. Dr Teo Prasad aware and ordered labs. Pt smiling and cooperative,denies pain.

## 2018-12-21 NOTE — ED NOTES
IV removed. Dressing applied. Discharge instructions and prescriptions given. Family states verbal understanding. Wheeled to exit.        Felicita Manuel RN  12/20/18 0429

## 2018-12-23 ENCOUNTER — TELEPHONE (OUTPATIENT)
Dept: PALLATIVE CARE | Age: 78
End: 2018-12-23

## 2018-12-23 ENCOUNTER — APPOINTMENT (OUTPATIENT)
Dept: CT IMAGING | Age: 78
End: 2018-12-23
Payer: MEDICARE

## 2018-12-23 ENCOUNTER — HOSPITAL ENCOUNTER (EMERGENCY)
Age: 78
Discharge: HOME | End: 2018-12-23
Attending: EMERGENCY MEDICINE
Payer: MEDICARE

## 2018-12-23 VITALS
HEART RATE: 88 BPM | HEIGHT: 67 IN | WEIGHT: 131 LBS | BODY MASS INDEX: 20.56 KG/M2 | TEMPERATURE: 98.5 F | RESPIRATION RATE: 18 BRPM | OXYGEN SATURATION: 99 % | DIASTOLIC BLOOD PRESSURE: 74 MMHG | SYSTOLIC BLOOD PRESSURE: 126 MMHG

## 2018-12-23 DIAGNOSIS — C79.31 BRAIN METASTASIS (HCC): ICD-10-CM

## 2018-12-23 DIAGNOSIS — E86.0 DEHYDRATION: Primary | ICD-10-CM

## 2018-12-23 LAB
ALBUMIN SERPL-MCNC: 3.3 G/DL (ref 3.5–5.2)
ALP BLD-CCNC: 72 U/L (ref 40–129)
ALT SERPL-CCNC: 10 U/L (ref 0–40)
ANION GAP SERPL CALCULATED.3IONS-SCNC: 14 MMOL/L (ref 7–16)
AST SERPL-CCNC: 17 U/L (ref 0–39)
BASOPHILS ABSOLUTE: 0.04 E9/L (ref 0–0.2)
BASOPHILS RELATIVE PERCENT: 0.5 % (ref 0–2)
BILIRUB SERPL-MCNC: 0.6 MG/DL (ref 0–1.2)
BUN BLDV-MCNC: 33 MG/DL (ref 8–23)
CALCIUM SERPL-MCNC: 8.6 MG/DL (ref 8.6–10.2)
CHLORIDE BLD-SCNC: 105 MMOL/L (ref 98–107)
CO2: 24 MMOL/L (ref 22–29)
CREAT SERPL-MCNC: 0.8 MG/DL (ref 0.7–1.2)
EOSINOPHILS ABSOLUTE: 0.01 E9/L (ref 0.05–0.5)
EOSINOPHILS RELATIVE PERCENT: 0.1 % (ref 0–6)
GFR AFRICAN AMERICAN: >60
GFR NON-AFRICAN AMERICAN: >60 ML/MIN/1.73
GLUCOSE BLD-MCNC: 101 MG/DL (ref 74–99)
HCT VFR BLD CALC: 29.8 % (ref 37–54)
HEMOGLOBIN: 9.5 G/DL (ref 12.5–16.5)
IMMATURE GRANULOCYTES #: 0.33 E9/L
IMMATURE GRANULOCYTES %: 4.4 % (ref 0–5)
LACTIC ACID: 2.2 MMOL/L (ref 0.5–2.2)
LYMPHOCYTES ABSOLUTE: 0.61 E9/L (ref 1.5–4)
LYMPHOCYTES RELATIVE PERCENT: 8.1 % (ref 20–42)
MCH RBC QN AUTO: 28 PG (ref 26–35)
MCHC RBC AUTO-ENTMCNC: 31.9 % (ref 32–34.5)
MCV RBC AUTO: 87.9 FL (ref 80–99.9)
MONOCYTES ABSOLUTE: 0.66 E9/L (ref 0.1–0.95)
MONOCYTES RELATIVE PERCENT: 8.8 % (ref 2–12)
NEUTROPHILS ABSOLUTE: 5.87 E9/L (ref 1.8–7.3)
NEUTROPHILS RELATIVE PERCENT: 78.1 % (ref 43–80)
PDW BLD-RTO: 18.9 FL (ref 11.5–15)
PLATELET # BLD: 160 E9/L (ref 130–450)
PMV BLD AUTO: 10.2 FL (ref 7–12)
POTASSIUM SERPL-SCNC: 3.3 MMOL/L (ref 3.5–5)
RBC # BLD: 3.39 E12/L (ref 3.8–5.8)
SODIUM BLD-SCNC: 143 MMOL/L (ref 132–146)
TOTAL PROTEIN: 5 G/DL (ref 6.4–8.3)
WBC # BLD: 7.5 E9/L (ref 4.5–11.5)

## 2018-12-23 PROCEDURE — 85025 COMPLETE CBC W/AUTO DIFF WBC: CPT

## 2018-12-23 PROCEDURE — 6370000000 HC RX 637 (ALT 250 FOR IP)

## 2018-12-23 PROCEDURE — 36415 COLL VENOUS BLD VENIPUNCTURE: CPT

## 2018-12-23 PROCEDURE — 6360000004 HC RX CONTRAST MEDICATION: Performed by: RADIOLOGY

## 2018-12-23 PROCEDURE — 96372 THER/PROPH/DIAG INJ SC/IM: CPT

## 2018-12-23 PROCEDURE — 83605 ASSAY OF LACTIC ACID: CPT

## 2018-12-23 PROCEDURE — 99284 EMERGENCY DEPT VISIT MOD MDM: CPT

## 2018-12-23 PROCEDURE — 2580000003 HC RX 258: Performed by: EMERGENCY MEDICINE

## 2018-12-23 PROCEDURE — 80053 COMPREHEN METABOLIC PANEL: CPT

## 2018-12-23 PROCEDURE — 70460 CT HEAD/BRAIN W/DYE: CPT

## 2018-12-23 PROCEDURE — 6360000002 HC RX W HCPCS: Performed by: EMERGENCY MEDICINE

## 2018-12-23 PROCEDURE — 2580000003 HC RX 258: Performed by: RADIOLOGY

## 2018-12-23 RX ORDER — SODIUM CHLORIDE 9 MG/ML
1000 INJECTION, SOLUTION INTRAVENOUS CONTINUOUS
Status: DISCONTINUED | OUTPATIENT
Start: 2018-12-23 | End: 2018-12-24 | Stop reason: HOSPADM

## 2018-12-23 RX ORDER — SODIUM CHLORIDE 0.9 % (FLUSH) 0.9 %
10 SYRINGE (ML) INJECTION ONCE
Status: COMPLETED | OUTPATIENT
Start: 2018-12-23 | End: 2018-12-23

## 2018-12-23 RX ORDER — SULFAMETHOXAZOLE AND TRIMETHOPRIM 800; 160 MG/1; MG/1
TABLET ORAL
Status: COMPLETED
Start: 2018-12-23 | End: 2018-12-23

## 2018-12-23 RX ORDER — DEXAMETHASONE SODIUM PHOSPHATE 4 MG/ML
4 INJECTION, SOLUTION INTRA-ARTICULAR; INTRALESIONAL; INTRAMUSCULAR; INTRAVENOUS; SOFT TISSUE ONCE
Status: COMPLETED | OUTPATIENT
Start: 2018-12-23 | End: 2018-12-23

## 2018-12-23 RX ORDER — SULFAMETHOXAZOLE AND TRIMETHOPRIM 800; 160 MG/1; MG/1
1 TABLET ORAL ONCE
Status: COMPLETED | OUTPATIENT
Start: 2018-12-23 | End: 2018-12-23

## 2018-12-23 RX ADMIN — SULFAMETHOXAZOLE AND TRIMETHOPRIM 1 TABLET: 800; 160 TABLET ORAL at 21:46

## 2018-12-23 RX ADMIN — DEXAMETHASONE SODIUM PHOSPHATE 4 MG: 4 INJECTION, SOLUTION INTRA-ARTICULAR; INTRALESIONAL; INTRAMUSCULAR; INTRAVENOUS; SOFT TISSUE at 22:49

## 2018-12-23 RX ADMIN — SODIUM CHLORIDE 1000 ML: 9 INJECTION, SOLUTION INTRAVENOUS at 20:48

## 2018-12-23 RX ADMIN — Medication 10 ML: at 21:17

## 2018-12-23 RX ADMIN — IOPAMIDOL 100 ML: 755 INJECTION, SOLUTION INTRAVENOUS at 21:17

## 2018-12-24 LAB
ORGANISM: ABNORMAL
ORGANISM: ABNORMAL
URINE CULTURE, ROUTINE: ABNORMAL

## 2018-12-24 NOTE — ED NOTES
Family requesting night dose of antibiotics. Attempted to give patient medication, patient became upset and refuses to take pill. Physician aware.       Mago Kirk RN  12/23/18 7337

## 2018-12-26 ENCOUNTER — TELEPHONE (OUTPATIENT)
Dept: PALLATIVE CARE | Age: 78
End: 2018-12-26

## 2018-12-26 ENCOUNTER — TELEPHONE (OUTPATIENT)
Dept: FAMILY MEDICINE CLINIC | Age: 78
End: 2018-12-26

## 2018-12-26 DIAGNOSIS — C43.9 METASTATIC MELANOMA (HCC): Primary | ICD-10-CM

## 2018-12-27 ENCOUNTER — TELEPHONE (OUTPATIENT)
Dept: FAMILY MEDICINE CLINIC | Age: 78
End: 2018-12-27

## 2018-12-27 DIAGNOSIS — Z51.5 HOSPICE CARE: Primary | ICD-10-CM

## 2018-12-28 DIAGNOSIS — Z51.5 HOSPICE CARE: ICD-10-CM

## 2018-12-28 RX ORDER — LORAZEPAM 0.5 MG/1
.5-1 TABLET ORAL EVERY 4 HOURS PRN
Qty: 30 TABLET | Refills: 2 | Status: SHIPPED | OUTPATIENT
Start: 2018-12-28 | End: 2019-01-27

## 2018-12-28 RX ORDER — ACETAMINOPHEN 650 MG/1
650 SUPPOSITORY RECTAL EVERY 4 HOURS PRN
Qty: 60 SUPPOSITORY | Refills: 3 | Status: CANCELLED | OUTPATIENT
Start: 2018-12-28

## 2018-12-28 RX ORDER — MORPHINE SULFATE 100 MG/5ML
10 SOLUTION ORAL
Qty: 30 ML | Refills: 0
Start: 2018-12-28 | End: 2019-01-27

## 2018-12-28 RX ORDER — GLYCOPYRROLATE 2 MG/1
2 TABLET ORAL 3 TIMES DAILY
Qty: 90 TABLET | Refills: 3 | Status: CANCELLED | OUTPATIENT
Start: 2018-12-28

## 2018-12-28 RX ORDER — OLANZAPINE 5 MG/1
5 TABLET ORAL NIGHTLY
Qty: 30 TABLET | Refills: 3 | Status: CANCELLED | OUTPATIENT
Start: 2018-12-28

## 2018-12-28 RX ORDER — MORPHINE SULFATE 100 MG/5ML
5 SOLUTION ORAL
Qty: 30 ML | Refills: 0 | Status: SHIPPED | OUTPATIENT
Start: 2018-12-28 | End: 2018-12-28 | Stop reason: SDUPTHER

## 2018-12-28 RX ORDER — HALOPERIDOL 1 MG/1
1 TABLET ORAL 3 TIMES DAILY
Qty: 120 TABLET | Refills: 3 | Status: CANCELLED | OUTPATIENT
Start: 2018-12-28

## (undated) DEVICE — DOUBLE BASIN SET: Brand: MEDLINE INDUSTRIES, INC.

## (undated) DEVICE — NEEDLE FLTR 18GA L1.5IN MEM THK5UM BLNT DISP

## (undated) DEVICE — 18GA (1.3MM OD: 1.0MM ID) X 7CM INTRODUCER18GA X 7CM NEEDLENEEDLE: Brand: INTRODUCER NEEDLEINTRODUCER NEEDLE

## (undated) DEVICE — CONTROL SYRINGE LUER-LOCK TIP: Brand: MONOJECT

## (undated) DEVICE — COVER HNDL LT DISP

## (undated) DEVICE — GAUZE,SPONGE,4"X4",16PLY,XRAY,STRL,LF: Brand: MEDLINE

## (undated) DEVICE — INTENDED FOR TISSUE SEPARATION, AND OTHER PROCEDURES THAT REQUIRE A SHARP SURGICAL BLADE TO PUNCTURE OR CUT.: Brand: BARD-PARKER ® STAINLESS STEEL BLADES

## (undated) DEVICE — STANDARD HYPODERMIC NEEDLE,ALUMINUM HUB: Brand: MONOJECT

## (undated) DEVICE — GAUZE,SPONGE,4"X4",8PLY,STRL,LF,10/TRAY: Brand: MEDLINE

## (undated) DEVICE — DECANTER: Brand: UNBRANDED

## (undated) DEVICE — BLADE CLIPPER GEN PURP NS

## (undated) DEVICE — COVER,TABLE,60X90,STERILE: Brand: MEDLINE

## (undated) DEVICE — COUNTER NDL 30 COUNT DBL MAG

## (undated) DEVICE — SUTURE VCRL + SZ 3-0 L18IN ABSRB UD PS-1 L24MM 3/8 CIR PRIM VCP683G

## (undated) DEVICE — SHEET, T, LAPAROTOMY, STERILE: Brand: MEDLINE

## (undated) DEVICE — SYRINGE MED 20ML STD CLR PLAS LUERLOCK TIP N CTRL DISP

## (undated) DEVICE — MEDIA CONTRAST ISOVUE  300 10X50ML

## (undated) DEVICE — SYRINGE, LUER LOCK, 5ML: Brand: MEDLINE

## (undated) DEVICE — COVER,LIGHT HANDLE,FLX,2/PK: Brand: MEDLINE INDUSTRIES, INC.

## (undated) DEVICE — MARKER,SKIN,WI/RULER AND LABELS: Brand: MEDLINE

## (undated) DEVICE — CHLORAPREP 26ML ORANGE

## (undated) DEVICE — SYRINGE MED 10ML POLYPR LUERSLIP TIP FLAT TOP W/O SFTY DISP

## (undated) DEVICE — SOLUTION IV 500ML 0.9% SOD CHL PH 5 INJ USP VIAFLX PLAS

## (undated) DEVICE — GOWN,SIRUS,FABRNF,XL,20/CS: Brand: MEDLINE

## (undated) DEVICE — GUIDEWIRE ENDOSCP L150CM DIA0.035IN TIP L15CM BENT PTFE

## (undated) DEVICE — DRAPE C ARM W41XL74IN UNIV MOB W RUBBERBAND CLP

## (undated) DEVICE — TOWEL,OR,DSP,ST,BLUE,STD,6/PK,12PK/CS: Brand: MEDLINE